# Patient Record
Sex: MALE | Race: WHITE | NOT HISPANIC OR LATINO | Employment: UNEMPLOYED | URBAN - NONMETROPOLITAN AREA
[De-identification: names, ages, dates, MRNs, and addresses within clinical notes are randomized per-mention and may not be internally consistent; named-entity substitution may affect disease eponyms.]

---

## 2021-03-18 ENCOUNTER — OFFICE VISIT CONVERTED (OUTPATIENT)
Dept: FAMILY MEDICINE CLINIC | Age: 31
End: 2021-03-18
Attending: NURSE PRACTITIONER

## 2021-05-17 ENCOUNTER — HOSPITAL ENCOUNTER (OUTPATIENT)
Dept: OTHER | Facility: HOSPITAL | Age: 31
Discharge: HOME | End: 2021-05-17
Attending: NURSE PRACTITIONER

## 2021-05-17 ENCOUNTER — OFFICE VISIT CONVERTED (OUTPATIENT)
Dept: FAMILY MEDICINE CLINIC | Age: 31
End: 2021-05-17
Attending: NURSE PRACTITIONER

## 2021-05-17 LAB — SARS-COV-2 RNA SPEC QL NAA+PROBE: DETECTED

## 2021-05-18 NOTE — PROGRESS NOTES
Alok Bedoya  1990     Office/Outpatient Visit    Visit Date: Thu, Mar 18, 2021 01:08 pm    Provider: Maryann Oconnell N.P. (Assistant: Ilda Steward LPN)    Location: [facility]        Electronically signed by Maryann Oconnell N.P. on  03/21/2021 08:08:37 PM                             Subjective:        CC: Venu is a 30 year old White male.  This is his first visit to the clinic.  est. care         HPI:       tee rocha therapist once per week.    PHQ-9 Depression Screening: Completed form scanned and in chart; Total Score 0           With regard to the encounter for general adult medical examination without abnormal findings, he cannot recall when he last had a physical exam.  He has never had a chest x-ray. His last ECG was 5 years ago and was normal.   He's had vision screening done in 3- and this was abnormal, revealing at rehab-  getting glasses.  Depression screen is performed and is negative.  He is current with his Td.  He is not current with influenza immunization.  states he got caught up in social and closet drinking (whisky) the last couple of years .  he checked himself into rehab 0  cannot recall name but near [place] , [place].  labs done at rehab and not anemic and liver enzymes normal.  he is attending counseling and alcoholics anonymous.  denies concerns.  has not smoked marijuana in over one month.      ROS:         GASTROINTESTINAL:  Negative for abdominal pain, heartburn, constipation, diarrhea, and stool changes.      ALLERGIC/IMMUNOLOGIC:  Positive for seasonal allergies.  CONSTITUTIONAL:  Negative for chills, fatigue, fever, and weight change.      E/N/T:  Negative for hearing problems, E/N/T pain, congestion, rhinorrhea, epistaxis, hoarseness, and dental problems.      CARDIOVASCULAR:  Negative for chest pain, palpitations, tachycardia, orthopnea, and edema.      RESPIRATORY:  Negative for cough, dyspnea, and hemoptysis.      GASTROINTESTINAL:   Negative for abdominal pain, heartburn, constipation, diarrhea, and stool changes.      MUSCULOSKELETAL:  Negative for arthralgias, back pain, and myalgias.      NEUROLOGICAL:  Negative for dizziness, headaches, paresthesias, and weakness.      ALLERGIC/IMMUNOLOGIC:  Positive for seasonal allergies.      PSYCHIATRIC:  Positive for feelings of stress and recreational drug use.   Negative for anxiety, depression, sleep disturbance or suicidal thoughts.          Past Medical History / Family History / Social History:         Last Reviewed on 3/18/2021 01:19 PM by Maryann Oconnell    Past Medical History: cardiac evaluation of murmur as a child and at age 26 normal.             PAST MEDICAL HISTORY     rehab near [place] feb 25 thru march 12 for alcoholism.  has IOP today at 4 pm via zoom.      anemia         Surgical History:         left thumb repair 2019 [facility];         Family History:     Father: Coronary Artery Disease;  Type 2 Diabetes     Mother: Hypertension;  Migraines         Social History:     Occupation: Unemployed     Marital Status: Single     Children: None         Tobacco/Alcohol/Supplements:     Last Reviewed on 3/18/2021 01:13 PM by Ilda Steward    Tobacco: He has a past history of cigarette smoking; quit date:  03/2021.   Current Smoker: He currently smokes every day, 1-5 cigarettes per day.   He currently uses smokeless tobacco, 1 can will last two weeks cans/pouches daily..          Current Problems:     Encounter for screening for depression        Immunizations:     None        Allergies:     Last Reviewed on 3/18/2021 01:13 PM by Ilda Steward    No Known Allergies.        Current Medications:     Last Reviewed on 10/28/2014 06:20 PM by Lisa Villalobos    None        Objective:        Vitals:         Current: 3/18/2021 1:15:02 PM    Ht:  5 ft, 11.25 in;  Wt: 152.2 lbs;  BMI: 21.1T: 96.5 F (temporal);  BP: 137/81 mm Hg (right arm, sitting);  P: 78 bpm (right arm (BP Cuff), sitting);  sCr:  1.04 mg/dL;  GFR: 94.17        Exams:     PHYSICAL EXAM:     GENERAL:  well developed and nourished; appropriately groomed; in no apparent distress;     EYES: PERRL, EOMI     E/N/T: EARS: bilateral TMs are normal;  OROPHARYNX: posterior pharynx, including tonsils, tongue, and uvula are normal;     NECK: thyroid is non-palpable;     RESPIRATORY: normal respiratory rate and pattern with no distress; normal breath sounds with no rales, rhonchi, wheezes or rubs;     CARDIOVASCULAR: normal rate; rhythm is regular;  no edema;     GASTROINTESTINAL: nontender; normal bowel sounds; no organomegaly;     MUSCULOSKELETAL:  Normal range of motion, strength and tone;     NEUROLOGIC: mental status: alert and oriented x 3; GROSSLY INTACT     PSYCHIATRIC:  appropriate affect and demeanor; normal speech pattern; grossly normal memory;         Assessment:         Z13.31   Encounter for screening for depression       Z00.00   Encounter for general adult medical examination without abnormal findings           ORDERS:         Other Orders:         Depression screen negative  (In-House)                      Plan:         Encounter for screening for depression    MIPS PHQ-9 Depression Screening: Completed form scanned and in chart; Total Score 0; Negative Depression Screen           Orders:         Depression screen negative  (In-House)              Encounter for general adult medical examination without abnormal findings        COUNSELING was provided today regarding the following topics: healthy eating habits, regular exercise, testicular self-exam, STD prevention, alcohol, and declines labs.  states he has had numerous labs recently while in rehab.  commend him on his efforts to refrain from alcohol consumption.  counseling and AA important to continuing sobriety.  he denies any anxiety or depression..            Patient Education Handouts:       Physical Exam 30-39 year, Male        TESTICULAR SELF-EXAM  PTC               Patient Recommendations:        For  Encounter for general adult medical examination without abnormal findings:    Limit dietary intake of fat (especially saturated fat) and cholesterol.  Eat a variety of foods, including plenty of fruits, vegetables, and grain containg fiber, limit fat intake to 30% of total calories. Balance caloric intake with energy expended. Maintaining regular physical activity is advised to help prevent heart disease, hypertension, diabetes, and obesity.    Testicular cancer is the #1 cancer in men ages 15-35.  You should regularly examine your testicles for knots, lumps, or tenderness. Testicular pain, even if not associated with any masses, needs to be evaluated by your doctor! Sexually transmitted diseases may be prevented by abstaining from sexual activity or avoiding sexual contact with high risk partners, and consistently using a condom or female barrier contraceptives plus spermacide.              Charge Capture:         Primary Diagnosis:     Z13.31  Encounter for screening for depression           Orders:        Depression screen negative  (In-House)              Z00.00  Encounter for general adult medical examination without abnormal findings           Orders:      49372  Preventive medicine, new patient, age 18-39 years  (In-House)

## 2021-06-05 NOTE — PROGRESS NOTES
Alok Bedoya  1990     Office/Outpatient Visit    Visit Date: Mon, May 17, 2021 10:52 am    Provider: Radha Álvarez N.P. (Assistant: No Jean MA)    Location: [facility]        Electronically signed by Radha Álvarez N.P. on  05/17/2021 05:39:51 PM                             Subjective:        CC: Venu is a 30 year old White male.  He presents with congestion, no taste or smell, pt states that his dad was dx with covid over the weekend.          HPI:           Complaint of contact with and (suspected) exposure to COVID-19..  The symptom began 4 days ago.  The severity is of mild intensity.  This is the first episode of this type of pain.  Dad is in ICU at [facility] with covid Pneumonia   Has been body aches, chills - no fever that he is aware.  Cough, congestion sinus pressure and loss of taste and smell     ROS:     CONSTITUTIONAL:  Positive for chills and fatigue.   Negative for fever.      E/N/T:  Positive for loss of taste and smell.      CARDIOVASCULAR:  Negative for chest pain and pedal edema.      RESPIRATORY:  Positive for recent cough and pleuritic chest pain.   Negative for dyspnea.      MUSCULOSKELETAL:  Positive for arthralgias and myalgias.      ALLERGIC/IMMUNOLOGIC:  Positive for seasonal allergies.          Past Medical History / Family History / Social History:         Last Reviewed on 5/17/2021 11:29 AM by Radha Álvarez    Past Medical History: cardiac evaluation of murmur as a child and at age 26 normal.             PAST MEDICAL HISTORY     rehab near [place] feb 25 thru march 12 for alcoholism.  has IOP today at 4 pm via zoom.      anemia         Surgical History:         left thumb repair 2019 [facility];         Family History:     Father: Coronary Artery Disease;  Type 2 Diabetes     Mother: Hypertension;  Migraines         Social History:     Occupation: Unemployed     Marital Status: Single     Children: None          Tobacco/Alcohol/Supplements:     Last Reviewed on 3/18/2021 01:13 PM by Ilda Steward    Tobacco: He has a past history of cigarette smoking; quit date:  03/2021.   Current Smoker: He currently smokes every day, 1-5 cigarettes per day.   He currently uses smokeless tobacco, 1 can will last two weeks cans/pouches daily..          Current Problems:     Last Reviewed on 5/17/2021 11:29 AM by Radha Álvarez    Encounter for general adult medical examination without abnormal findings    Encounter for screening for depression    Contact with and (suspected) exposure to COVID-19    Cough        Immunizations:     None        Allergies:     Last Reviewed on 3/18/2021 01:13 PM by Ilda Steward    No Known Allergies.        Current Medications:     Last Reviewed on 5/17/2021 10:55 AM by No Jean        Objective:        Vitals:         Current: 5/17/2021 10:54:56 AM    Ht:  5 ft, 11.25 in;  Wt: 152.7 lbs;  BMI: 21.1T: 96.4 F (temporal);  BP: 131/86 mm Hg (left arm, sitting);  P: 70 bpm (left arm (BP Cuff), sitting);  sCr: 1.04 mg/dL;  GFR: 94.30        Exams:     PHYSICAL EXAM:     GENERAL: Vitals recorded well developed, well nourished;  no apparent distress;     E/N/T: EARS: external auditory canal normal bilaterally and draining bilaterally;  bilateral TMs are normal;  NOSE:  normal nasal mucosa, septum, turbinates, and sinuses; OROPHARYNX:  normal mucosa, dentition, gingiva, and posterior pharynx;     NECK: range of motion is normal;     RESPIRATORY: normal appearance and symmetric expansion of chest wall; normal respiratory rate and pattern with no distress; normal breath sounds with no rales, rhonchi, wheezes or rubs;     CARDIOVASCULAR: normal rate; rhythm is regular;  no edema;     GASTROINTESTINAL: nontender; normal bowel sounds; no organomegaly;     LYMPHATIC: no enlargement of cervical or facial nodes; no supraclavicular nodes;     MUSCULOSKELETAL: normal gait; normal range of motion of all major  muscle groups; no limb or joint pain with range of motion;     NEUROLOGIC: mental status: alert and oriented x 3;     PSYCHIATRIC: appropriate affect and demeanor; normal speech pattern; normal thought and perception;         Lab/Test Results:         Rapid SARS: Negative (05/17/2021),     Performed by: tls (05/17/2021),             Assessment:         R05   Cough       Z20.822   Contact with and (suspected) exposure to COVID-19           ORDERS:         Lab Orders:       [id number]  Coronavirus antigen detection by immunoassay technique, COVID-19  (In-House)            [id number]  COVID 19 Testing  (Send-Out)                      Plan:         Cough    LABORATORY:  Labs ordered to be performed today include COVID 19 Testing and Covid Rapid Sun.            Orders:       [id number]  Coronavirus antigen detection by immunoassay technique, COVID-19  (In-House)            [id number]  COVID 19 Testing  (Send-Out)              Contact with and (suspected) exposure to COVID-19off from work pending result of testing.  I would recommend that he drink plenty of water, lots of rest and avoid contact with others- self quarantine            Charge Capture:         Primary Diagnosis:     R05  Cough           Orders:      [id number]  Office/outpatient visit; established patient, level 3  (In-House)            [id number]  Coronavirus antigen detection by immunoassay technique, COVID-19  (In-House)              Z20.822  Contact with and (suspected) exposure to COVID-19

## 2021-07-02 VITALS
TEMPERATURE: 96.5 F | WEIGHT: 152.2 LBS | DIASTOLIC BLOOD PRESSURE: 81 MMHG | HEART RATE: 78 BPM | BODY MASS INDEX: 21.31 KG/M2 | SYSTOLIC BLOOD PRESSURE: 137 MMHG | HEIGHT: 71 IN

## 2021-07-02 VITALS
SYSTOLIC BLOOD PRESSURE: 131 MMHG | HEART RATE: 70 BPM | TEMPERATURE: 96.4 F | WEIGHT: 152.7 LBS | BODY MASS INDEX: 21.38 KG/M2 | DIASTOLIC BLOOD PRESSURE: 86 MMHG | HEIGHT: 71 IN

## 2021-11-17 ENCOUNTER — HOSPITAL ENCOUNTER (OUTPATIENT)
Dept: GENERAL RADIOLOGY | Facility: HOSPITAL | Age: 31
Discharge: HOME | End: 2021-11-17

## 2021-11-17 ENCOUNTER — LAB (OUTPATIENT)
Dept: LAB | Facility: HOSPITAL | Age: 31
End: 2021-11-17

## 2021-11-17 ENCOUNTER — OFFICE VISIT (OUTPATIENT)
Dept: FAMILY MEDICINE CLINIC | Age: 31
End: 2021-11-17

## 2021-11-17 VITALS
BODY MASS INDEX: 20.92 KG/M2 | HEART RATE: 70 BPM | WEIGHT: 149.4 LBS | TEMPERATURE: 98.2 F | DIASTOLIC BLOOD PRESSURE: 79 MMHG | SYSTOLIC BLOOD PRESSURE: 127 MMHG | HEIGHT: 71 IN

## 2021-11-17 DIAGNOSIS — M54.6 ACUTE MIDLINE THORACIC BACK PAIN: ICD-10-CM

## 2021-11-17 DIAGNOSIS — M54.6 ACUTE MIDLINE THORACIC BACK PAIN: Primary | ICD-10-CM

## 2021-11-17 LAB
ANION GAP SERPL CALCULATED.3IONS-SCNC: 7.9 MMOL/L (ref 5–15)
BUN SERPL-MCNC: 7 MG/DL (ref 6–20)
BUN/CREAT SERPL: 7.7 (ref 7–25)
CALCIUM SPEC-SCNC: 9.7 MG/DL (ref 8.6–10.5)
CHLORIDE SERPL-SCNC: 103 MMOL/L (ref 98–107)
CO2 SERPL-SCNC: 29.1 MMOL/L (ref 22–29)
CREAT SERPL-MCNC: 0.91 MG/DL (ref 0.76–1.27)
GFR SERPL CREATININE-BSD FRML MDRD: 97 ML/MIN/1.73
GLUCOSE SERPL-MCNC: 99 MG/DL (ref 65–99)
POTASSIUM SERPL-SCNC: 4 MMOL/L (ref 3.5–5.2)
SODIUM SERPL-SCNC: 140 MMOL/L (ref 136–145)

## 2021-11-17 PROCEDURE — 99213 OFFICE O/P EST LOW 20 MIN: CPT | Performed by: NURSE PRACTITIONER

## 2021-11-17 PROCEDURE — 72072 X-RAY EXAM THORAC SPINE 3VWS: CPT

## 2021-11-17 PROCEDURE — 80048 BASIC METABOLIC PNL TOTAL CA: CPT

## 2021-11-17 PROCEDURE — 36415 COLL VENOUS BLD VENIPUNCTURE: CPT

## 2021-11-17 RX ORDER — MELOXICAM 7.5 MG/1
7.5 TABLET ORAL DAILY
Qty: 14 TABLET | Refills: 0 | Status: SHIPPED | OUTPATIENT
Start: 2021-11-17 | End: 2021-12-01

## 2021-11-17 RX ORDER — BACLOFEN 10 MG/1
10 TABLET ORAL 3 TIMES DAILY PRN
Qty: 30 TABLET | Refills: 0 | Status: SHIPPED | OUTPATIENT
Start: 2021-11-17 | End: 2022-01-03

## 2021-11-17 NOTE — PROGRESS NOTES
"Chief Complaint  Back Pain    Subjective          Alok Bedoya presents to [facility]  Back pain started today after \"lifting something.\"    Back Pain  The current episode started today. The problem occurs constantly. The problem has been gradually worsening since onset. The pain is present in the thoracic spine. The quality of the pain is described as shooting. The pain is at a severity of 7/10. The pain is moderate. Exacerbated by: \"anything\" Associated symptoms include numbness and tingling. Risk factors: Recently lifting  He has tried bed rest for the symptoms. The treatment provided no relief.       Objective   Vital Signs:   /79 (BP Location: Right arm, Patient Position: Sitting)   Pulse 70   Temp 98.2 °F (36.8 °C) (Oral)   Ht 181 cm (71.26\")   Wt 67.8 kg (149 lb 6.4 oz)   BMI 20.69 kg/m²     Physical Exam  Constitutional:       General: He is not in acute distress.     Appearance: Normal appearance. He is normal weight.   HENT:      Head: Normocephalic.   Eyes:      Pupils: Pupils are equal, round, and reactive to light.      Visual Fields: Right eye visual fields normal and left eye visual fields normal.   Neck:      Trachea: Trachea normal.   Cardiovascular:      Rate and Rhythm: Normal rate and regular rhythm.      Heart sounds: Normal heart sounds.   Pulmonary:      Effort: Pulmonary effort is normal.      Breath sounds: Normal breath sounds and air entry.   Musculoskeletal:         General: No swelling or tenderness.      Right lower leg: No edema.      Left lower leg: No edema.   Skin:     General: Skin is warm and dry.   Neurological:      Mental Status: He is alert and oriented to person, place, and time.   Psychiatric:         Mood and Affect: Mood and affect normal.         Behavior: Behavior normal.         Thought Content: Thought content normal.        Result Review :   The following data was reviewed by: ISABELA Concepcion on 11/17/2021:     "              Assessment and Plan    Diagnoses and all orders for this visit:    1. Acute midline thoracic back pain (Primary)  -     XR Spine Thoracic 3 View; Future  -     meloxicam (Mobic) 7.5 MG tablet; Take 1 tablet by mouth Daily for 14 days.  Dispense: 14 tablet; Refill: 0  -     baclofen (LIORESAL) 10 MG tablet; Take 1 tablet by mouth 3 (Three) Times a Day As Needed for Muscle Spasms.  Dispense: 30 tablet; Refill: 0  -     Basic metabolic panel; Future    Will obtain thoracic x-ray and BMP.  We will send the meloxicam and baclofen.  The patient will defer PT at this time.      Follow Up   Return if symptoms worsen or fail to improve.  Patient was given instructions and counseling regarding his condition or for health maintenance advice. Please see specific information pulled into the AVS if appropriate.

## 2021-11-17 NOTE — PATIENT INSTRUCTIONS
Acute Back Pain, Adult  Acute back pain is sudden and usually short-lived. It is often caused by an injury to the muscles and tissues in the back. The injury may result from:  · A muscle or ligament getting overstretched or torn (strained). Ligaments are tissues that connect bones to each other. Lifting something improperly can cause a back strain.  · Wear and tear (degeneration) of the spinal disks. Spinal disks are circular tissue that provide cushioning between the bones of the spine (vertebrae).  · Twisting motions, such as while playing sports or doing yard work.  · A hit to the back.  · Arthritis.  You may have a physical exam, lab tests, and imaging tests to find the cause of your pain. Acute back pain usually goes away with rest and home care.  Follow these instructions at home:  Managing pain, stiffness, and swelling  · Treatment may include medicines for pain and inflammation that are taken by mouth or applied to the skin, prescription pain medicine, or muscle relaxants. Take over-the-counter and prescription medicines only as told by your health care provider.  · Your health care provider may recommend applying ice during the first 24-48 hours after your pain starts. To do this:  ? Put ice in a plastic bag.  ? Place a towel between your skin and the bag.  ? Leave the ice on for 20 minutes, 2-3 times a day.  · If directed, apply heat to the affected area as often as told by your health care provider. Use the heat source that your health care provider recommends, such as a moist heat pack or a heating pad.  ? Place a towel between your skin and the heat source.  ? Leave the heat on for 20-30 minutes.  ? Remove the heat if your skin turns bright red. This is especially important if you are unable to feel pain, heat, or cold. You have a greater risk of getting burned.  Activity    · Do not stay in bed. Staying in bed for more than 1-2 days can delay your recovery.  · Sit up and stand up straight. Avoid  leaning forward when you sit or hunching over when you stand.  ? If you work at a desk, sit close to it so you do not need to lean over. Keep your chin tucked in. Keep your neck drawn back, and keep your elbows bent at a 90-degree angle (right angle).  ? Sit high and close to the steering wheel when you drive. Add lower back (lumbar) support to your car seat, if needed.  · Take short walks on even surfaces as soon as you are able. Try to increase the length of time you walk each day.  · Do not sit, drive, or  one place for more than 30 minutes at a time. Sitting or standing for long periods of time can put stress on your back.  · Do not drive or use heavy machinery while taking prescription pain medicine.  · Use proper lifting techniques. When you bend and lift, use positions that put less stress on your back:  ? Bend your knees.  ? Keep the load close to your body.  ? Avoid twisting.  · Exercise regularly as told by your health care provider. Exercising helps your back heal faster and helps prevent back injuries by keeping muscles strong and flexible.  · Work with a physical therapist to make a safe exercise program, as recommended by your health care provider. Do any exercises as told by your physical therapist.    Lifestyle  · Maintain a healthy weight. Extra weight puts stress on your back and makes it difficult to have good posture.  · Avoid activities or situations that make you feel anxious or stressed. Stress and anxiety increase muscle tension and can make back pain worse. Learn ways to manage anxiety and stress, such as through exercise.  General instructions  · Sleep on a firm mattress in a comfortable position. Try lying on your side with your knees slightly bent. If you lie on your back, put a pillow under your knees.  · Follow your treatment plan as told by your health care provider. This may include:  ? Cognitive or behavioral therapy.  ? Acupuncture or massage therapy.  ? Meditation or  yoga.  Contact a health care provider if:  · You have pain that is not relieved with rest or medicine.  · You have increasing pain going down into your legs or buttocks.  · Your pain does not improve after 2 weeks.  · You have pain at night.  · You lose weight without trying.  · You have a fever or chills.  Get help right away if:  · You develop new bowel or bladder control problems.  · You have unusual weakness or numbness in your arms or legs.  · You develop nausea or vomiting.  · You develop abdominal pain.  · You feel faint.  Summary  · Acute back pain is sudden and usually short-lived.  · Use proper lifting techniques. When you bend and lift, use positions that put less stress on your back.  · Take over-the-counter and prescription medicines and apply heat or ice as directed by your health care provider.  This information is not intended to replace advice given to you by your health care provider. Make sure you discuss any questions you have with your health care provider.  Document Revised: 10/15/2020 Document Reviewed: 08/01/2018  Elsevier Patient Education © 2021 [employer]

## 2021-11-18 ENCOUNTER — TELEPHONE (OUTPATIENT)
Dept: FAMILY MEDICINE CLINIC | Age: 31
End: 2021-11-18

## 2022-01-03 ENCOUNTER — OFFICE VISIT (OUTPATIENT)
Dept: FAMILY MEDICINE CLINIC | Age: 32
End: 2022-01-03

## 2022-01-03 ENCOUNTER — HOSPITAL ENCOUNTER (OUTPATIENT)
Dept: GENERAL RADIOLOGY | Facility: HOSPITAL | Age: 32
Discharge: HOME | End: 2022-01-03
Admitting: FAMILY MEDICINE

## 2022-01-03 VITALS
SYSTOLIC BLOOD PRESSURE: 151 MMHG | WEIGHT: 150.3 LBS | HEART RATE: 80 BPM | BODY MASS INDEX: 21.04 KG/M2 | DIASTOLIC BLOOD PRESSURE: 79 MMHG | HEIGHT: 71 IN

## 2022-01-03 DIAGNOSIS — M25.512 CHRONIC LEFT SHOULDER PAIN: Primary | ICD-10-CM

## 2022-01-03 DIAGNOSIS — G89.29 CHRONIC LEFT SHOULDER PAIN: Primary | ICD-10-CM

## 2022-01-03 PROCEDURE — 99213 OFFICE O/P EST LOW 20 MIN: CPT | Performed by: FAMILY MEDICINE

## 2022-01-03 PROCEDURE — 73030 X-RAY EXAM OF SHOULDER: CPT

## 2022-01-03 RX ORDER — PREDNISONE 10 MG/1
TABLET ORAL
Qty: 48 TABLET | Refills: 0 | Status: SHIPPED | OUTPATIENT
Start: 2022-01-03 | End: 2022-02-18

## 2022-01-03 RX ORDER — CHLORZOXAZONE 500 MG/1
500 TABLET ORAL 3 TIMES DAILY PRN
Qty: 12 TABLET | Refills: 0 | Status: SHIPPED | OUTPATIENT
Start: 2022-01-03 | End: 2022-02-18

## 2022-01-03 RX ORDER — PREDNISONE 10 MG/1
TABLET ORAL
Qty: 1 EACH | Refills: 0 | Status: SHIPPED | OUTPATIENT
Start: 2022-01-03 | End: 2022-01-03

## 2022-01-03 NOTE — PROGRESS NOTES
Please advise patient his shoulder x-ray was normal.  There does not appear to be any calcific tendinitis as we had discussed and certainly no bony abnormalities fractures or dislocation.  I would advise him to take the steroid pack, muscle relaxers and attend physical therapy as ordered.  He also needs a follow-up appointment within 3 to 4 weeks to make sure he is improving.  Thank you    Patient called back and said he was still suffering from significant disability from his shoulder.  Apparently the steroids are not working.  He still agreeable to go to physical therapy but I agree with him at this juncture that it is time to get orthopedic surgery involved.  Referral was generated for orthopedic surgery referral.

## 2022-01-03 NOTE — PROGRESS NOTES
Chief Complaint  Shoulder Pain (Pt c/o LEFT shoulder pain, pt states that the pain has gotten slightly worse.)    Subjective          Alok Bedoya presents to [facility]  History of Present Illness 31-year-old male with left shoulder pain. Is right handed.  He believes he may have injured it at work but he is not sure.  He was doing a lot of heavy lifting pushing and pulling at work and then a couple of days later he started getting shoulder pain.  He was seen here in November had thoracic spine films which were unremarkable for any acute fractures.  He was placed on a muscle relaxer and he is used all of the baclofen but it has not really helped at all.  He is at the point now where he feels something must be done because he can barely AB duct his arm.  He has a lot of pain in the deltoid and the rotator cuff area on the left.  He rates the pain as just a sharp stabbing constant aching anywhere between 3 out of 10 and if he tries to move it or do any kind of activity such as lifting anything it rises to about a 7 or 8 out of 10 burning stabbing pain.    Review of Systems   Constitutional: Positive for activity change. Negative for appetite change, chills, diaphoresis, fatigue, fever and unexpected weight change.   Respiratory: Negative.    Cardiovascular: Negative.    Musculoskeletal: Positive for arthralgias and myalgias. Negative for back pain, gait problem, joint swelling, neck pain and neck stiffness.   Skin: Negative.    Neurological: Positive for weakness. Negative for numbness.        Patient feels his left arm is quite weak because he just cannot really move it without significant pain in the left shoulder.        No Known Allergies    Current Outpatient Medications:   •  chlorzoxazone (PARAFON FORTE) 500 MG tablet, Take 1 tablet by mouth 3 (Three) Times a Day As Needed for Muscle Spasms., Disp: 12 tablet, Rfl: 0  •  predniSONE (DELTASONE) 10 MG (48) dose pack, Take  "as directed on package., Disp: 1 each, Rfl: 0    Objective   Vital Signs:   /79 (BP Location: Right arm, Patient Position: Sitting)   Pulse 80   Ht 181 cm (71.26\")   Wt 68.2 kg (150 lb 4.8 oz)   BMI 20.81 kg/m²     Physical Exam  Constitutional:       Appearance: Normal appearance. He is normal weight.   Neck:      Vascular: No carotid bruit.   Cardiovascular:      Rate and Rhythm: Normal rate and regular rhythm.      Pulses: Normal pulses.      Heart sounds: Normal heart sounds.   Pulmonary:      Effort: Pulmonary effort is normal.      Breath sounds: Normal breath sounds.   Musculoskeletal:         General: Tenderness and signs of injury present. No swelling or deformity.      Cervical back: Normal range of motion and neck supple. No rigidity or tenderness.      Right lower leg: No edema.      Left lower leg: No edema.      Comments: Range of motion about the left shoulder is severely limited.  Abduction is only to about 30 degrees in an attempt to raise it to the horizontal.  External and internal rotation the patient absolutely refuses to do.  Exam is limited by patient splinting.  I do not appreciate any crepitance or deformity.  There is no point tenderness per se.  Adduction is severely limited as well to only maybe 15 degrees.    Patient's pain is reproduced by attempt to AB duct against challenge.   Lymphadenopathy:      Cervical: No cervical adenopathy.   Neurological:      Mental Status: He is alert.        Result Review :              Assessment and Plan    Diagnoses and all orders for this visit:    1. Chronic left shoulder pain (Primary)  -     XR Shoulder 2+ View Left  -     Discontinue: predniSONE (DELTASONE) 10 MG (48) dose pack; Take as directed on package.  Dispense: 1 each; Refill: 0  -     Ambulatory Referral to Physical Therapy Evaluate and treat; Heat, Electrotherapy; Soft Tissue Mobilizaton, Desensitization, Cross Fiber; Stretching, ROM, Strengthening  -     chlorzoxazone (PARAFON " FORTE) 500 MG tablet; Take 1 tablet by mouth 3 (Three) Times a Day As Needed for Muscle Spasms.  Dispense: 12 tablet; Refill: 0  -     predniSONE (DELTASONE) 10 MG (48) dose pack; Take as directed on package.  Dispense: 1 each; Refill: 0      Patient will be sent to physical therapy and on gondi give him a stronger muscle relaxer along with a steroid pack.  He says he is out of the baclofen so he should discontinue this if he has any of it at home and patient was advised not to take baclofen along with the Parafon forte have given him the day.  X-rays were ordered as well and I will call him with the results when they become available.    Patient should follow-up in 3 to 4 weeks to see if he is making any progress.  If at that time he cannot tolerate physical therapy or he has had minimal improvement in his range of motion and symptoms, he will need orthopedic surgery consult.  If in fact there are abnormalities on the x-ray such as calcific tendinitis, patient may require earlier orthopedic surgery referral.  Follow Up   No follow-ups on file.  Patient was given instructions and counseling regarding his condition or for health maintenance advice. Please see specific information pulled into the AVS if appropriate.

## 2022-01-19 ENCOUNTER — OFFICE VISIT (OUTPATIENT)
Dept: ORTHOPEDIC SURGERY | Facility: CLINIC | Age: 32
End: 2022-01-19

## 2022-01-19 VITALS — OXYGEN SATURATION: 99 % | WEIGHT: 150.4 LBS | BODY MASS INDEX: 21.06 KG/M2 | HEART RATE: 72 BPM | HEIGHT: 71 IN

## 2022-01-19 DIAGNOSIS — M24.812 INTERNAL DERANGEMENT OF LEFT SHOULDER: Primary | ICD-10-CM

## 2022-01-19 DIAGNOSIS — M25.512 ACUTE PAIN OF LEFT SHOULDER: ICD-10-CM

## 2022-01-19 PROCEDURE — 99204 OFFICE O/P NEW MOD 45 MIN: CPT | Performed by: STUDENT IN AN ORGANIZED HEALTH CARE EDUCATION/TRAINING PROGRAM

## 2022-01-19 RX ORDER — IBUPROFEN 800 MG/1
800 TABLET ORAL EVERY 6 HOURS PRN
COMMUNITY
End: 2022-02-18

## 2022-01-19 NOTE — PROGRESS NOTES
"Chief Complaint  Pain of the Left Shoulder    Subjective          Alok Bedoya presents to [facility] for   History of Present Illness    The patient presents here today for evaluation of the left shoulder. He reports a constant pain to the lateral shoulder. The patient does heating and air. He has no specific injury. He reports pain radiates down his arm and to his neck. He has no other complaints. He reports the pain started in November. He denies prior injuries or surgeries to the shoulder.     No Known Allergies     Social History     Socioeconomic History   • Marital status: Single   Tobacco Use   • Smoking status: Former Smoker     Types: Cigarettes   • Smokeless tobacco: Never Used        I reviewed the patient's chief complaint, history of present illness, review of systems, past medical history, surgical history, family history, social history, medications, and allergy list.     REVIEW OF SYSTEMS    Constitutional: Denies fevers, chills, weight loss  Cardiovascular: Denies chest pain, shortness of breath  Skin: Denies rashes, acute skin changes  Neurologic: Denies headache, loss of consciousness  MSK: Left shoulder pain      Objective   Vital Signs:   Pulse 72   Ht 180.3 cm (71\")   Wt 68.2 kg (150 lb 6.4 oz)   SpO2 99%   BMI 20.98 kg/m²     Body mass index is 20.98 kg/m².    Physical Exam    General: Alert. No acute distress.   Left shoulder- tender to lateral shoulder and biceps tendon. Forward elevation active, 60. Passive 180. Pop with elevation. External Rotation 70 with pain. Internal rotation to lower lumbar spine. 4/5 and pain with supraspinatus and infraspinatus testing. 5/5 subscapularis with pain. Positive impingement testing. equivocal [place] testing. Pain and pop with labral testing. Sensation to light touch median, radial, ulnar nerve. Positive AIN, PIN, ulnar nerve. Positive pulses. Full active finger ROM.     Procedures    Imaging Results (Most Recent) "     None                   Assessment and Plan    Diagnoses and all orders for this visit:    1. Internal derangement of left shoulder (Primary)  -     MRI Shoulder Left Without Contrast; Future    2. Acute pain of left shoulder        Discussed the treatment plan with the patient.  Plan for MRI of the left shoulder to evaluate for internal derangement. Advised him to try to maintain his ROM. Plan for OTC medication and topicals and ice for the pain.     Call or return if symptoms worsen or patient has any concerns.     Scribed for Chapin Caballero MD by Chapin Caballero MD  01/19/2022   10:49 EST         Follow Up   After MRI  Patient was given instructions and counseling regarding his condition or for health maintenance advice. Please see specific information pulled into the AVS if appropriate.       I have personally performed the services described in this document as scribed by the above individual and it is both accurate and complete.     Chapin Caballero MD  01/19/22  16:56 EST

## 2022-02-10 ENCOUNTER — HOSPITAL ENCOUNTER (OUTPATIENT)
Dept: MRI IMAGING | Facility: HOSPITAL | Age: 32
Discharge: HOME | End: 2022-02-10
Admitting: STUDENT IN AN ORGANIZED HEALTH CARE EDUCATION/TRAINING PROGRAM

## 2022-02-10 DIAGNOSIS — M24.812 INTERNAL DERANGEMENT OF LEFT SHOULDER: ICD-10-CM

## 2022-02-10 PROCEDURE — 73221 MRI JOINT UPR EXTREM W/O DYE: CPT

## 2022-02-11 ENCOUNTER — TELEPHONE (OUTPATIENT)
Dept: ORTHOPEDIC SURGERY | Facility: CLINIC | Age: 32
End: 2022-02-11

## 2022-02-14 ENCOUNTER — OFFICE VISIT (OUTPATIENT)
Dept: ORTHOPEDIC SURGERY | Facility: CLINIC | Age: 32
End: 2022-02-14

## 2022-02-14 VITALS — HEIGHT: 71 IN | WEIGHT: 148 LBS | HEART RATE: 76 BPM | OXYGEN SATURATION: 99 % | BODY MASS INDEX: 20.72 KG/M2

## 2022-02-14 DIAGNOSIS — S43.432A TEAR OF LEFT GLENOID LABRUM, INITIAL ENCOUNTER: ICD-10-CM

## 2022-02-14 DIAGNOSIS — S43.432A PARALABRAL CYST OF LEFT SHOULDER, INITIAL ENCOUNTER: Primary | ICD-10-CM

## 2022-02-14 PROCEDURE — 99214 OFFICE O/P EST MOD 30 MIN: CPT | Performed by: STUDENT IN AN ORGANIZED HEALTH CARE EDUCATION/TRAINING PROGRAM

## 2022-02-14 RX ORDER — KETOROLAC TROMETHAMINE 10 MG/1
10 TABLET, FILM COATED ORAL EVERY 6 HOURS PRN
Qty: 60 TABLET | Refills: 1 | Status: SHIPPED | OUTPATIENT
Start: 2022-02-14 | End: 2022-02-16

## 2022-02-14 NOTE — PROGRESS NOTES
"Chief Complaint  Pain of the Left Shoulder    Subjective          Alok Bedoya presents to [facility] for   History of Present Illness    The patient presents here today for follow up evaluation of the left shoulder. He reports a constant pain to the lateral shoulder. The patient does heating and air. He has no specific injury. He reports pain radiates down his arm and to his neck. He has no other complaints. He reports the pain started in November. He recently had an MRI and is here today for those results.     Allergies   Allergen Reactions   • Prednisone Myalgia     AND REPORTS CAUSED HIS THROAT DRY OUT AND NOSE TO BLEED        Social History     Socioeconomic History   • Marital status: Single   Tobacco Use   • Smoking status: Current Some Day Smoker     Types: Cigarettes   • Smokeless tobacco: Never Used   • Tobacco comment: OVER A YEAR AGO   Vaping Use   • Vaping Use: Some days   • Substances: Nicotine   • Devices: Pre-filled pod   Substance and Sexual Activity   • Alcohol use: Not Currently   • Drug use: Never   • Sexual activity: Defer        I reviewed the patient's chief complaint, history of present illness, review of systems, past medical history, surgical history, family history, social history, medications, and allergy list.     REVIEW OF SYSTEMS    Constitutional: Denies fevers, chills, weight loss  Cardiovascular: Denies chest pain, shortness of breath  Skin: Denies rashes, acute skin changes  Neurologic: Denies headache, loss of consciousness  MSK: Left shoulder pain      Objective   Vital Signs:   Pulse 76   Ht 180.3 cm (71\")   Wt 67.1 kg (148 lb)   SpO2 99%   BMI 20.64 kg/m²     Body mass index is 20.64 kg/m².    Physical Exam    General: Alert. No acute distress.   Left shoulder-Forward elevation 180 degrees. Popping with ROM. Positive Cuellar. Pain with posterior loading in the shoulder. Neurovascularly intact. Sensation to light touch median, radial, ulnar " nerve. Positive AIN, PIN, ulnar nerve motor function. Positive pulses. 4/5 rotator cuff testing with pain.    Procedures    Imaging Results (Most Recent)     None         [facility] MRI- . Tear of the posterosuperior labrum.  2. Large 3.4 cm x 1.9 cm cystic focus posterior to the posteroinferior labrum favored to represent   a large paralabral cyst.  3. Edema in the distal clavicle and adjacent acromion may represent an osseous contusion in the   setting of previous direct trauma.  Osteolysis related to chronic repetitive type injury would also   be in the differential.  Septic arthritis is considered possible but less likely.          Assessment and Plan    Diagnoses and all orders for this visit:    1. Paralabral cyst of left shoulder, initial encounter (Primary)    2. Tear of left glenoid labrum, initial encounter  -     Discontinue: ketorolac (TORADOL) 10 MG tablet; Take 1 tablet by mouth Every 6 (Six) Hours As Needed for Moderate Pain .  Dispense: 60 tablet; Refill: 1        Discussed the treatment options with the patient, operative vs non-operative. Discussed the risks and benefits of a left shoulder arthroscopic labral repair with excision of paralabral cyst, SAD, and distal clavicle excision. The patient expressed understanding and wished to think about it and will call back to schedule. Prescription for Toradol given today.     Call or return if symptoms worsen or patient has any concerns.     Scribed for Chapin Caballero MD by Chapin Caballero MD  02/14/2022   09:56 EST     Follow Up   2 weeks postoperatively.    Patient was given instructions and counseling regarding his condition or for health maintenance advice. Please see specific information pulled into the AVS if appropriate.       I have personally performed the services described in this document as scribed by the above individual and it is both accurate and complete.     Chapin Caballero MD  03/02/22  14:08 EST

## 2022-02-16 ENCOUNTER — TELEPHONE (OUTPATIENT)
Dept: ORTHOPEDIC SURGERY | Facility: CLINIC | Age: 32
End: 2022-02-16

## 2022-02-16 ENCOUNTER — PREP FOR SURGERY (OUTPATIENT)
Dept: OTHER | Facility: HOSPITAL | Age: 32
End: 2022-02-16

## 2022-02-16 DIAGNOSIS — S43.432A TEAR OF LEFT GLENOID LABRUM, INITIAL ENCOUNTER: Primary | ICD-10-CM

## 2022-02-16 RX ORDER — CEFAZOLIN SODIUM IN 0.9 % NACL 3 G/100 ML
3 INTRAVENOUS SOLUTION, PIGGYBACK (ML) INTRAVENOUS ONCE
Status: CANCELLED | OUTPATIENT
Start: 2022-02-16 | End: 2022-02-16

## 2022-02-16 RX ORDER — KETOROLAC TROMETHAMINE 10 MG/1
10 TABLET, FILM COATED ORAL EVERY 6 HOURS PRN
Qty: 20 TABLET | Refills: 0 | Status: SHIPPED | OUTPATIENT
Start: 2022-02-16 | End: 2022-07-05

## 2022-02-16 RX ORDER — CEFAZOLIN SODIUM 2 G/100ML
2 INJECTION, SOLUTION INTRAVENOUS ONCE
Status: CANCELLED | OUTPATIENT
Start: 2022-02-16 | End: 2022-02-16

## 2022-02-16 NOTE — TELEPHONE ENCOUNTER
SPOKE WITH UVALDO WITH [facility] WHO STATED THEY RECEIVED A TRANSFERRED SCRIPT FROM [facility] FOR TORADOL 10 MG THAT EXCEEDS THE RECOMMENDED DOSAGE AND IT HAD A REFILL ON MEDICATION UVALDO STATED THEY NORMALLY ONLY FILL FOR 5 DAYS.

## 2022-02-16 NOTE — TELEPHONE ENCOUNTER
Provider: FREEMAN  Caller: Tootie Elke (Mother)  Phone Number: [phone]   BO- [phone]  Reason for Call: MOM IS CALLING TO REPORT PT HAS DECIDED TO MOVE FORWARD WITH SX.     BO'S PHONE DOSE NOT ALWAYS WORK, IF NOT WE CAN REACH OUT TO HER.

## 2022-02-24 ENCOUNTER — CLINICAL SUPPORT (OUTPATIENT)
Dept: FAMILY MEDICINE CLINIC | Age: 32
End: 2022-02-24

## 2022-02-24 DIAGNOSIS — S43.432A TEAR OF LEFT GLENOID LABRUM, INITIAL ENCOUNTER: ICD-10-CM

## 2022-02-24 PROCEDURE — 99211 OFF/OP EST MAY X REQ PHY/QHP: CPT | Performed by: FAMILY MEDICINE

## 2022-02-24 PROCEDURE — U0004 COV-19 TEST NON-CDC HGH THRU: HCPCS | Performed by: STUDENT IN AN ORGANIZED HEALTH CARE EDUCATION/TRAINING PROGRAM

## 2022-02-25 DIAGNOSIS — S43.432A TEAR OF LEFT GLENOID LABRUM, INITIAL ENCOUNTER: Primary | ICD-10-CM

## 2022-02-25 LAB — SARS-COV-2 RNA PNL SPEC NAA+PROBE: NOT DETECTED

## 2022-03-03 ENCOUNTER — CLINICAL SUPPORT (OUTPATIENT)
Dept: FAMILY MEDICINE CLINIC | Age: 32
End: 2022-03-03

## 2022-03-03 DIAGNOSIS — S43.432A TEAR OF LEFT GLENOID LABRUM, INITIAL ENCOUNTER: ICD-10-CM

## 2022-03-03 PROCEDURE — U0004 COV-19 TEST NON-CDC HGH THRU: HCPCS | Performed by: STUDENT IN AN ORGANIZED HEALTH CARE EDUCATION/TRAINING PROGRAM

## 2022-03-04 LAB — SARS-COV-2 RNA PNL SPEC NAA+PROBE: NOT DETECTED

## 2022-03-08 ENCOUNTER — ANESTHESIA EVENT (OUTPATIENT)
Dept: PERIOP | Facility: HOSPITAL | Age: 32
End: 2022-03-08

## 2022-03-08 ENCOUNTER — ANESTHESIA (OUTPATIENT)
Dept: PERIOP | Facility: HOSPITAL | Age: 32
End: 2022-03-08

## 2022-03-08 ENCOUNTER — HOSPITAL ENCOUNTER (OUTPATIENT)
Facility: HOSPITAL | Age: 32
Setting detail: HOSPITAL OUTPATIENT SURGERY
Discharge: HOME | End: 2022-03-08
Attending: STUDENT IN AN ORGANIZED HEALTH CARE EDUCATION/TRAINING PROGRAM | Admitting: STUDENT IN AN ORGANIZED HEALTH CARE EDUCATION/TRAINING PROGRAM

## 2022-03-08 VITALS
RESPIRATION RATE: 20 BRPM | OXYGEN SATURATION: 99 % | HEIGHT: 75 IN | SYSTOLIC BLOOD PRESSURE: 152 MMHG | BODY MASS INDEX: 18.2 KG/M2 | TEMPERATURE: 97.5 F | DIASTOLIC BLOOD PRESSURE: 90 MMHG | HEART RATE: 76 BPM | WEIGHT: 146.39 LBS

## 2022-03-08 DIAGNOSIS — S43.432A PARALABRAL CYST OF LEFT SHOULDER, INITIAL ENCOUNTER: Primary | ICD-10-CM

## 2022-03-08 DIAGNOSIS — M24.812 INTERNAL DERANGEMENT OF LEFT SHOULDER: ICD-10-CM

## 2022-03-08 DIAGNOSIS — S43.432A TEAR OF LEFT GLENOID LABRUM, INITIAL ENCOUNTER: ICD-10-CM

## 2022-03-08 PROCEDURE — 25010000002 KETOROLAC TROMETHAMINE PER 15 MG: Performed by: NURSE ANESTHETIST, CERTIFIED REGISTERED

## 2022-03-08 PROCEDURE — 25010000002 MIDAZOLAM PER 1 MG: Performed by: STUDENT IN AN ORGANIZED HEALTH CARE EDUCATION/TRAINING PROGRAM

## 2022-03-08 PROCEDURE — C1713 ANCHOR/SCREW BN/BN,TIS/BN: HCPCS | Performed by: STUDENT IN AN ORGANIZED HEALTH CARE EDUCATION/TRAINING PROGRAM

## 2022-03-08 PROCEDURE — 25010000002 CEFAZOLIN IN DEXTROSE 2-4 GM/100ML-% SOLUTION: Performed by: STUDENT IN AN ORGANIZED HEALTH CARE EDUCATION/TRAINING PROGRAM

## 2022-03-08 PROCEDURE — 0 MEPERIDINE PER 100 MG: Performed by: NURSE ANESTHETIST, CERTIFIED REGISTERED

## 2022-03-08 PROCEDURE — 25010000002 ONDANSETRON PER 1 MG: Performed by: NURSE ANESTHETIST, CERTIFIED REGISTERED

## 2022-03-08 PROCEDURE — 25010000002 EPINEPHRINE PER 0.1 MG: Performed by: STUDENT IN AN ORGANIZED HEALTH CARE EDUCATION/TRAINING PROGRAM

## 2022-03-08 PROCEDURE — 29807 SHO ARTHRS SRG RPR SLAP LES: CPT | Performed by: STUDENT IN AN ORGANIZED HEALTH CARE EDUCATION/TRAINING PROGRAM

## 2022-03-08 PROCEDURE — 25010000002 ROPIVACAINE PER 1 MG: Performed by: STUDENT IN AN ORGANIZED HEALTH CARE EDUCATION/TRAINING PROGRAM

## 2022-03-08 PROCEDURE — 29826 SHO ARTHRS SRG DECOMPRESSION: CPT | Performed by: STUDENT IN AN ORGANIZED HEALTH CARE EDUCATION/TRAINING PROGRAM

## 2022-03-08 PROCEDURE — 25010000002 PROPOFOL 10 MG/ML EMULSION: Performed by: NURSE ANESTHETIST, CERTIFIED REGISTERED

## 2022-03-08 PROCEDURE — 25010000002 DEXAMETHASONE PER 1 MG: Performed by: NURSE ANESTHETIST, CERTIFIED REGISTERED

## 2022-03-08 PROCEDURE — 29824 SHO ARTHRS SRG DSTL CLAVICLC: CPT | Performed by: STUDENT IN AN ORGANIZED HEALTH CARE EDUCATION/TRAINING PROGRAM

## 2022-03-08 DEVICE — SUT/ANCH FIBERTAK KNOTLSS  W/NO2 SUT 1.8MM: Type: IMPLANTABLE DEVICE | Site: SHOULDER | Status: FUNCTIONAL

## 2022-03-08 RX ORDER — OXYCODONE HYDROCHLORIDE 5 MG/1
5 TABLET ORAL
Status: DISCONTINUED | OUTPATIENT
Start: 2022-03-08 | End: 2022-03-08 | Stop reason: HOSPADM

## 2022-03-08 RX ORDER — MEPERIDINE HYDROCHLORIDE 25 MG/ML
12.5 INJECTION INTRAMUSCULAR; INTRAVENOUS; SUBCUTANEOUS
Status: DISCONTINUED | OUTPATIENT
Start: 2022-03-08 | End: 2022-03-08 | Stop reason: HOSPADM

## 2022-03-08 RX ORDER — PROMETHAZINE HYDROCHLORIDE 25 MG/1
25 SUPPOSITORY RECTAL ONCE AS NEEDED
Status: DISCONTINUED | OUTPATIENT
Start: 2022-03-08 | End: 2022-03-08 | Stop reason: HOSPADM

## 2022-03-08 RX ORDER — OXYCODONE HYDROCHLORIDE AND ACETAMINOPHEN 5; 325 MG/1; MG/1
1 TABLET ORAL EVERY 4 HOURS PRN
Qty: 30 TABLET | Refills: 0 | Status: SHIPPED | OUTPATIENT
Start: 2022-03-08 | End: 2022-07-05

## 2022-03-08 RX ORDER — CEFAZOLIN SODIUM 2 G/100ML
2 INJECTION, SOLUTION INTRAVENOUS ONCE
Status: COMPLETED | OUTPATIENT
Start: 2022-03-08 | End: 2022-03-08

## 2022-03-08 RX ORDER — KETOROLAC TROMETHAMINE 30 MG/ML
INJECTION, SOLUTION INTRAMUSCULAR; INTRAVENOUS AS NEEDED
Status: DISCONTINUED | OUTPATIENT
Start: 2022-03-08 | End: 2022-03-08 | Stop reason: SURG

## 2022-03-08 RX ORDER — PROPOFOL 10 MG/ML
VIAL (ML) INTRAVENOUS AS NEEDED
Status: DISCONTINUED | OUTPATIENT
Start: 2022-03-08 | End: 2022-03-08 | Stop reason: SURG

## 2022-03-08 RX ORDER — ACETAMINOPHEN 500 MG
1000 TABLET ORAL ONCE
Status: COMPLETED | OUTPATIENT
Start: 2022-03-08 | End: 2022-03-08

## 2022-03-08 RX ORDER — DEXAMETHASONE SODIUM PHOSPHATE 4 MG/ML
INJECTION, SOLUTION INTRA-ARTICULAR; INTRALESIONAL; INTRAMUSCULAR; INTRAVENOUS; SOFT TISSUE AS NEEDED
Status: DISCONTINUED | OUTPATIENT
Start: 2022-03-08 | End: 2022-03-08 | Stop reason: SURG

## 2022-03-08 RX ORDER — PROMETHAZINE HYDROCHLORIDE 12.5 MG/1
25 TABLET ORAL ONCE AS NEEDED
Status: DISCONTINUED | OUTPATIENT
Start: 2022-03-08 | End: 2022-03-08 | Stop reason: HOSPADM

## 2022-03-08 RX ORDER — DOCUSATE SODIUM 100 MG/1
100 CAPSULE, LIQUID FILLED ORAL 2 TIMES DAILY PRN
Qty: 20 CAPSULE | Refills: 0 | Status: SHIPPED | OUTPATIENT
Start: 2022-03-08 | End: 2022-07-05

## 2022-03-08 RX ORDER — GLYCOPYRROLATE 0.2 MG/ML
INJECTION INTRAMUSCULAR; INTRAVENOUS AS NEEDED
Status: DISCONTINUED | OUTPATIENT
Start: 2022-03-08 | End: 2022-03-08 | Stop reason: SURG

## 2022-03-08 RX ORDER — ONDANSETRON 4 MG/1
4 TABLET, FILM COATED ORAL EVERY 8 HOURS PRN
Qty: 30 TABLET | Refills: 0 | Status: SHIPPED | OUTPATIENT
Start: 2022-03-08 | End: 2022-07-05

## 2022-03-08 RX ORDER — KETAMINE HYDROCHLORIDE 50 MG/ML
INJECTION, SOLUTION, CONCENTRATE INTRAMUSCULAR; INTRAVENOUS AS NEEDED
Status: DISCONTINUED | OUTPATIENT
Start: 2022-03-08 | End: 2022-03-08 | Stop reason: SURG

## 2022-03-08 RX ORDER — MAGNESIUM HYDROXIDE 1200 MG/15ML
LIQUID ORAL AS NEEDED
Status: DISCONTINUED | OUTPATIENT
Start: 2022-03-08 | End: 2022-03-08 | Stop reason: HOSPADM

## 2022-03-08 RX ORDER — SODIUM CHLORIDE, SODIUM LACTATE, POTASSIUM CHLORIDE, CALCIUM CHLORIDE 600; 310; 30; 20 MG/100ML; MG/100ML; MG/100ML; MG/100ML
9 INJECTION, SOLUTION INTRAVENOUS CONTINUOUS PRN
Status: DISCONTINUED | OUTPATIENT
Start: 2022-03-08 | End: 2022-03-08 | Stop reason: HOSPADM

## 2022-03-08 RX ORDER — ROPIVACAINE HYDROCHLORIDE 5 MG/ML
INJECTION, SOLUTION EPIDURAL; INFILTRATION; PERINEURAL
Status: COMPLETED | OUTPATIENT
Start: 2022-03-08 | End: 2022-03-08

## 2022-03-08 RX ORDER — DEXMEDETOMIDINE HYDROCHLORIDE 100 UG/ML
INJECTION, SOLUTION INTRAVENOUS AS NEEDED
Status: DISCONTINUED | OUTPATIENT
Start: 2022-03-08 | End: 2022-03-08 | Stop reason: SURG

## 2022-03-08 RX ORDER — ONDANSETRON 2 MG/ML
INJECTION INTRAMUSCULAR; INTRAVENOUS AS NEEDED
Status: DISCONTINUED | OUTPATIENT
Start: 2022-03-08 | End: 2022-03-08 | Stop reason: SURG

## 2022-03-08 RX ORDER — CEFAZOLIN SODIUM IN 0.9 % NACL 3 G/100 ML
3 INTRAVENOUS SOLUTION, PIGGYBACK (ML) INTRAVENOUS ONCE
Status: DISCONTINUED | OUTPATIENT
Start: 2022-03-08 | End: 2022-03-08 | Stop reason: SDUPTHER

## 2022-03-08 RX ORDER — ONDANSETRON 2 MG/ML
4 INJECTION INTRAMUSCULAR; INTRAVENOUS ONCE AS NEEDED
Status: DISCONTINUED | OUTPATIENT
Start: 2022-03-08 | End: 2022-03-08 | Stop reason: HOSPADM

## 2022-03-08 RX ORDER — LIDOCAINE HYDROCHLORIDE 20 MG/ML
INJECTION, SOLUTION INFILTRATION; PERINEURAL AS NEEDED
Status: DISCONTINUED | OUTPATIENT
Start: 2022-03-08 | End: 2022-03-08 | Stop reason: SURG

## 2022-03-08 RX ORDER — MIDAZOLAM HYDROCHLORIDE 1 MG/ML
4 INJECTION INTRAMUSCULAR; INTRAVENOUS ONCE
Status: COMPLETED | OUTPATIENT
Start: 2022-03-08 | End: 2022-03-08

## 2022-03-08 RX ORDER — ROCURONIUM BROMIDE 10 MG/ML
INJECTION, SOLUTION INTRAVENOUS AS NEEDED
Status: DISCONTINUED | OUTPATIENT
Start: 2022-03-08 | End: 2022-03-08 | Stop reason: SURG

## 2022-03-08 RX ADMIN — DEXAMETHASONE SODIUM PHOSPHATE 8 MG: 4 INJECTION INTRA-ARTICULAR; INTRALESIONAL; INTRAMUSCULAR; INTRAVENOUS; SOFT TISSUE at 10:21

## 2022-03-08 RX ADMIN — LIDOCAINE HYDROCHLORIDE 100 MG: 20 INJECTION, SOLUTION INFILTRATION; PERINEURAL at 10:15

## 2022-03-08 RX ADMIN — MEPERIDINE HYDROCHLORIDE 12.5 MG: 25 INJECTION INTRAMUSCULAR; INTRAVENOUS; SUBCUTANEOUS at 12:45

## 2022-03-08 RX ADMIN — ONDANSETRON 4 MG: 2 INJECTION INTRAMUSCULAR; INTRAVENOUS at 11:38

## 2022-03-08 RX ADMIN — DEXMEDETOMIDINE HYDROCHLORIDE 40 MCG: 100 INJECTION, SOLUTION, CONCENTRATE INTRAVENOUS at 10:15

## 2022-03-08 RX ADMIN — PROPOFOL 200 MG: 10 INJECTION, EMULSION INTRAVENOUS at 10:15

## 2022-03-08 RX ADMIN — CEFAZOLIN SODIUM 2 G: 2 INJECTION, SOLUTION INTRAVENOUS at 10:17

## 2022-03-08 RX ADMIN — SODIUM CHLORIDE, POTASSIUM CHLORIDE, SODIUM LACTATE AND CALCIUM CHLORIDE: 600; 310; 30; 20 INJECTION, SOLUTION INTRAVENOUS at 11:38

## 2022-03-08 RX ADMIN — ROPIVACAINE HYDROCHLORIDE 30 ML: 5 INJECTION, SOLUTION EPIDURAL; INFILTRATION; PERINEURAL at 08:57

## 2022-03-08 RX ADMIN — KETAMINE HYDROCHLORIDE 50 MG: 50 INJECTION, SOLUTION INTRAMUSCULAR; INTRAVENOUS at 10:15

## 2022-03-08 RX ADMIN — ROCURONIUM BROMIDE 50 MG: 10 INJECTION INTRAVENOUS at 10:15

## 2022-03-08 RX ADMIN — MIDAZOLAM HYDROCHLORIDE 4 MG: 1 INJECTION, SOLUTION INTRAMUSCULAR; INTRAVENOUS at 08:52

## 2022-03-08 RX ADMIN — KETOROLAC TROMETHAMINE 30 MG: 30 INJECTION, SOLUTION INTRAMUSCULAR; INTRAVENOUS at 10:42

## 2022-03-08 RX ADMIN — ACETAMINOPHEN 1000 MG: 500 TABLET ORAL at 08:23

## 2022-03-08 RX ADMIN — SODIUM CHLORIDE, POTASSIUM CHLORIDE, SODIUM LACTATE AND CALCIUM CHLORIDE 9 ML/HR: 600; 310; 30; 20 INJECTION, SOLUTION INTRAVENOUS at 09:09

## 2022-03-08 RX ADMIN — GLYCOPYRROLATE 0.2 MG: 0.2 INJECTION INTRAMUSCULAR; INTRAVENOUS at 10:21

## 2022-03-08 NOTE — OP NOTE
SHOULDER ARTHROSCOPY LABRAL REPAIR  Procedure Report    Patient Name:  Alok Bedoya  YOB: 1990    Date of Surgery:  3/8/2022     Indications: Alok is a 31-year-old male with a long history of left shoulder pain.  He had pain and popping with elevation.  He had pain with overhead lifting.  His MRI revealed a tear of the posterior superior labrum, a para labral cyst, and distal clavicle osteolysis.  We attempted nonoperative measures.  We discussed surgical treatment.  We specifically discussed left shoulder arthroscopy with labral repair, decompression of the paralabral cyst, subacromial decompression, and distal clavicle excision.  We discussed the primary benefits of surgery including pain relief and improve shoulder function.  We discussed surgical risk including bleeding, infection, damage to nerves and blood vessels, hardware complications, fracture, chondral injury, retear, stiffness, persistent pain, anesthesia risk, DVT/PE, and need for additional procedures.  He elected to proceed with surgical management.    Pre-op Diagnosis:   Tear of left glenoid labrum, initial encounter [S43.432A]       Post-Op Diagnosis Codes:     * Tear of left glenoid labrum, initial encounter [S43.432A]    Procedure/CPT® Codes:      Procedure(s):  LEFT SHOULDER ARTHROSCOPIC LABRAL REPAIR WITH DECOMPRESSION PARALABRAL CYST, SUBCROMIAL DECOMPRESSION,DISTAL CLAVICLE EXCISION    Staff:  Surgeon(s):  Chapin Caballero MD    Assistant: Milvia Mera PA-C    Anesthesia: General    Estimated Blood Loss: 30 ML    Implants:    Implant Name Type Inv. Item Serial No.  Lot No. LRB No. Used Action   SUT/ANCH FIBERTAK KNOTLSS  W/NO2 SUT 1.8MM - [id number] Implant SUT/ANCH FIBERTAK KNOTLSS  W/NO2 SUT 1.8MM  [employer] [id number] Left 2 Implanted       Specimen:          None        Findings: SLAP tear with unstable biceps anchor, distal clavicle osteolysis    Complications: None    Description of Procedure: The  patient was met in the preoperative holding area and the operative extremity was marked.  A preoperative peripheral nerve block was performed by the anesthesia team.  The patient was transported the operating room and laid supine on the operating room table.  General anesthesia was induced.  2 g of preoperative Ancef were administered.  The patient was then carefully placed in the lateral decubitus beachchair position with all extremities well-padded.  He was secured with a beanbag.  An arm holding device was used during the case.  The left upper extremity was prepped and draped in the usual sterile fashion.  A timeout was taken to ensure the appropriate patient, procedure, and procedural site.  All were in agreement.  I began by marking the superficial landmarks over the shoulder.  A vertical incision was made for a posterior viewing portal and the arthroscope was inserted into the glenohumeral joint.  A diagnostic arthroscopy was performed.  A SLAP tear was identified.  The biceps anchor was unstable and detached from the superior glenoid.  The posterior inferior and anterior inferior labrum were intact.  The rotator cuff was intact.  The biceps tendon was intact.  A  vertical incision was made for a high anterior working portal after needle localization.  I inserted the arthroscopic shaver.  The interval tissue was debrided.  An Arthrex purple cannula was inserted.  I switch my visualization to the anterior superior portal and evaluated the posterior labrum thoroughly.  This was intact and stable along the posterior rim.  The paralabral cyst was not directly identified.  I did use a blunt switching stick and ran this down the posterior border of the glenoid in an attempt to decompress the cyst.  I then returned to the posterior viewing portal.  A second anterior portal was created after needle localization through a vertical incision just superior to the subscapularis tendon.  A second Arthrex purple cannula was  inserted.  I utilized the arthroscopic shaver, elevator, and bur to prepare the superior glenoid for a SLAP repair.  I then drilled and inserted two 1.8 mm fiber tack knotless anchors without complication.  One anchor was placed anterior to the biceps insertion, and one anchor posterior to the biceps insertion.  I utilized the suture lasso to retrieve and shuttled the stitches appropriately.  This secured the biceps anchor without complication.  The sutures were cut.  The arthroscope was then removed from the glenohumeral joint and inserted into the subacromial space.  A vertical incision for a lateral working portal was created after needle localization.  A subacromial bursectomy was performed.   I then utilized the arthroscopic shaver to perform a subacromial decompression, elevating the anterior enthesophyte on the acromion.  This was carried back to the level of the AC joint.  Degenerative and cystic changes were noted at the distal clavicle.  I utilized the cautery device to debride the soft tissue at the AC joint.  I then performed a distal clavicle excision utilizing the arthroscopic shaver in standard fashion removing approximately 1 cm of bone.  At this point I was satisfied with my work in the shoulder.  All arthroscopic instrumentation was removed.  Wounds were closed with 3-0 nylon in interrupted fashion.  Wounds were dressed with Xeroform, 4 x 4, ABD, and tape.  The patient was placed into an abduction sling.  He woke from anesthesia without complication and was transferred to the recovery room in stable condition.  All surgical counts were correct.    Assistant: Milvia Mera PA-C  was responsible for performing the following activities: Retraction, Suction, Irrigation and Placing Dressing and their skilled assistance was necessary for the success of this case.    Chapin Caabllero MD     Date: 3/8/2022  Time: 12:38 EST

## 2022-03-08 NOTE — ANESTHESIA PROCEDURE NOTES
Peripheral Block      Patient reassessed immediately prior to procedure    Patient location during procedure: pre-op  Start time: 3/8/2022 8:57 AM  Stop time: 3/8/2022 8:59 AM  Reason for block: at surgeon's request and post-op pain management  Performed by  Anesthesiologist: Lanny Lackey DO  Preanesthetic Checklist  Completed: patient identified, IV checked, site marked, risks and benefits discussed, surgical consent, monitors and equipment checked, pre-op evaluation and timeout performed  Prep:  Pt Position: supine (HOB elevated)  Sterile barriers:cap, washed/disinfected hands, sterile barriers, gloves, mask, partial drape and alcohol skin prep  Prep: ChloraPrep  Patient monitoring: blood pressure monitoring, continuous pulse oximetry and EKG  Procedure    Sedation: yes  Performed under: local infiltration  Guidance:ultrasound guided and nerve stimulator    ULTRASOUND INTERPRETATION.  Using ultrasound guidance a 22 G gauge needle was placed in close proximity to the brachial plexus nerve, at which point, under ultrasound guidance anesthetic was injected in the area of the nerve and spread of the anesthesia was seen on ultrasound in close proximity thereto.  There were no abnormalities seen on ultrasound; a digital image was taken; and the patient tolerated the procedure with no complications. Images:still images obtained, printed/placed on chart    Laterality:left  Block Type:interscalene  Injection Technique:single-shot  Needle Type:echogenic  Needle Gauge:22 G (2in)  Resistance on Injection: none    Medications Used: ropivacaine (NAROPIN) 0.5 % injection, 30 mL  Med administered at 3/8/2022 8:57 AM      Post Assessment  Injection Assessment: negative aspiration for heme, no paresthesia on injection and incremental injection  Patient Tolerance:comfortable throughout block  Complications:no  Additional Notes  The block or continuous infusion is requested by the referring physician for management of  postoperative pain, or pain related to a procedure. Ultrasound guidance (deemed medically necessary). Painless injection, pt was awake and conversant during the procedure without complications. Needle and surrounding structures visualized throughout procedure. No adverse reactions or complications seen during this period. Post-procedure image showed no signs of complication, and anatomy was consistent with an uncomplicated nerve blockade.

## 2022-03-08 NOTE — DISCHARGE INSTRUCTIONS
[facility]  Postop Instructions  Outpatient Shoulder Surgery    DIET  Begin with clear liquids and light foods (jello, soups, etc).  Progress to your normal diet if you are not nauseated.  Take pain medicine with food - crackers, bread, etc.    WOUND CARE  Maintain your operative dressing, loosen bandage if swelling or tingling of the elbow, wrist, or hand occurs.  It is normal for the shoulder to bleed and swell from the surgery site.  If blood soaks onto the bandage, do not become alarmed; reinforce with additional dressing.  If blood saturates more than 2 bandages, call [facility].  Remove surgical dressing on the 2nd post-operative day. If minimal drainage is present, apply bandaids over incisions.  Do not use antibiotic ointment.  To avoid infection, keep surgical incisions clean and dry.  You may shower on the 2nd day but do not submerge.    MEDICATIONS  Pain medication is injected into the wound and shoulder joint during surgery.  This will wear off within 8 to 12 hours.  The anesthesiologist's regional nerve block will usually wear off in 18 to 24 hours.  Aleve 500mg 2 times per day may be taken for pain if you do not have a history of bleeding or ulcers.  Some patients will require narcotic pain medication for a short period of time.  This can be taken as per directions on the bottle.  Potential narcotic side effects include: constipation, nausea, vomiting, sleepiness.  If nausea and vomiting continue for more than 12-24 hours, contact the office to have your medication changed.  Do not drive a car or operate machinery while taking the prescription pain medication.  Increase vegetables, whole grains, and water intake to decrease risk of constipation related to pain medications.  Drink a full glass of water with every dose of medication (prescription or over the counter) and take with food.    ACTIVITY  When sleeping or resting, inclined positions (ie recliner chair) and a pillow under the forearm  for support may provide better comfort.  Do not engage in activities which increase pain/swelling (lifting or any repetitive above shoulder-level activities) over the first 7-10 days following surgery or activities where you bring your arm away from your body.  Avoid long periods of sitting (without arm supported) or long distance traveling for 2 weeks.  No driving or operating heavy machinery until you are off all prescription pain medications.  You may return to sedentary work or school 1-3 days after surgery, if pain is tolerable with sling.  SLING  Repairs and Reconstructions:  For the first two weeks, you must wear sling/immobilizer at all times except when doing exercises.  When around people in close proximity or in inclement weather, you should wear sling for protection.    ICE THERAPY  Begin icing immediately after surgery using ice compression machine or cold packs.    EXERCISE   Begin shoulder exercises the following day after surgery unless otherwise instructed by the surgeon.  PendulumNeris parisi 3 x daily  You may also begin elbow, wrist, and hand range of motion on the first post-operative day about 2-3 times per day.  Formal physical therapy, if needed, will begin 2 - 6 weeks after surgery.      Neris                             Pendulums                           EMERGENCIES  Contact [facility] if any of the following are present:  Painful swelling or numbness, tingling, color change, or coolness in the wrist or hand  Unrelenting pain  Fever over 101 (It is normal to have a low grade fever for the first day or two following surgery.)   Redness or worsening pain around incisions  Continuous drainage or bleeding from incision (a small amount of drainage is expected)  Difficulty breathing, wheezing  Excessive nausea/vomiting causing inability to keep anything down for 12-24hours  Inability to urinate    WHAT TO EXPECT AT YOUR FIRST POST OPERATIVE VISIT  Suture/stitches will be removed and new bandage  applied if needed.  Follow up X-rays may be taken      DISCHARGE INSTRUCTIONS  SURGICAL / AMBULATORY  PROCEDURES      For your surgery you had:  General anesthesia (you may have a sore throat for the first 24 hours)  IV sedation.  Local anesthesia  Monitored anesthesia Care  You received a medicated patch for nausea prevention today (behind your ear). It is recommended that you remove it 24-48 hours post-operatively. It must be removed within 72 hours.   You may experience dizziness, drowsiness, or light-headedness for several hours following surgery/procedure.  Do not stay alone today or tonight.  Limit your activity for 24 hours.  Resume your diet slowly.  Follow whatever special dietary instructions you may have been given by your doctor.  You should not drive or operate machinery, drink alcohol, or sign legally binding documents for 24 hours or while you are taking pain medication.  Last dose of pain medication was given at:   .  NOTIFY YOUR DOCTOR IF YOU EXPERIENCE ANY OF THE FOLLOWING:  Temperature greater than 101 degrees Fahrenheit  Shaking Chills  Redness or excessive drainage from incision  Nausea, vomiting and/or pain that is not controlled by prescribed medications  Increase in bleeding or bleeding that is excessive  Unable to urinate in 6 hours after surgery  If unable to reach your doctor, please go to the closest Emergency Room  You may begin dressing changes:     [x] in 48 hours  You may remove Band-Aid/dressing in 48 hours.  You may shower in 48 hours.  Medications per physician instructions as indicated on Discharge Medication Information Sheet.    SPECIAL INSTRUCTIONS:

## 2022-03-08 NOTE — INTERVAL H&P NOTE
H&P reviewed. The patient was examined and there are no changes to the H&P.     I have seen and examined the above patient and agree with the plan.     Cardiac: Intact peripheral pulses  Pulmonary: Nonlabored respirations on room air.     We again reviewed treatment options today.  We discussed surgical management for his left shoulder.  We reviewed the risks, benefits, indications, and alternatives to left shoulder arthroscopy with labral repair, cyst decompression, subacromial decompression, and distal clavicle excision.  We discussed the rehab process and expectations.  The patient elected to proceed with surgical management.    Electronically signed by Chapin Caballero MD, 03/08/22, 8:02 AM EST.

## 2022-03-08 NOTE — ANESTHESIA POSTPROCEDURE EVALUATION
Patient: Alok Bedoya    Procedure Summary     Date: 03/08/22 Room / Location: [facility]  / [facility]    Anesthesia Start: 1015 Anesthesia Stop: 1239    Procedure: LEFT SHOULDER ARTHROSCOPIC LABRAL REPAIR WITH EXCISION PARALABRAL CYST, SUBCROMIAL DECOMPRESSION,DISTAL CLAVICLE EXCISION (Left Shoulder) Diagnosis:       Tear of left glenoid labrum, initial encounter      (Tear of left glenoid labrum, initial encounter [S43.432A])    Surgeons: Chapin Caballero MD Provider: Lanny Lackey DO    Anesthesia Type: general, regional ASA Status: 2          Anesthesia Type: general, regional    Vitals  Vitals Value Taken Time   /104 03/08/22 1316   Temp     Pulse 70 03/08/22 1315   Resp     SpO2 99 % 03/08/22 1315   Vitals shown include unvalidated device data.        Post Anesthesia Care and Evaluation    Patient location during evaluation: bedside  Patient participation: complete - patient participated  Level of consciousness: awake  Pain management: adequate  Airway patency: patent  Anesthetic complications: No anesthetic complications  PONV Status: none  Cardiovascular status: acceptable and stable  Respiratory status: acceptable  Hydration status: acceptable    Comments: An Anesthesiologist personally participated in the most demanding procedures (including induction and emergence if applicable) in the anesthesia plan, monitored the course of anesthesia administration at frequent intervals and remained physically present and available for immediate diagnosis and treatment of emergencies.

## 2022-03-08 NOTE — ANESTHESIA PREPROCEDURE EVALUATION
Anesthesia Evaluation     Patient summary reviewed and Nursing notes reviewed   no history of anesthetic complications:  NPO Solid Status: > 8 hours  NPO Liquid Status: > 2 hours           Airway   Mallampati: I  TM distance: >3 FB  No difficulty expected  Dental - normal exam     Pulmonary - normal exam   (+) a smoker Current Smoked day of surgery,   Cardiovascular - normal exam  Exercise tolerance: good (4-7 METS)    (+) valvular problems/murmurs murmur,       Neuro/Psych- negative ROS  GI/Hepatic/Renal/Endo - negative ROS     Musculoskeletal (-) negative ROS    Abdominal  - normal exam   Substance History - negative use     OB/GYN negative ob/gyn ROS         Other - negative ROS                       Anesthesia Plan    ASA 2     general and regional   (Patient understands anesthesia not responsible for dental damage. Regional anesthesia options discussed with patient. Pt accepts regional block.)  intravenous induction     Anesthetic plan, all risks, benefits, and alternatives have been provided, discussed and informed consent has been obtained with: patient.    Plan discussed with CRNA.        CODE STATUS:

## 2022-03-23 ENCOUNTER — OFFICE VISIT (OUTPATIENT)
Dept: ORTHOPEDIC SURGERY | Facility: CLINIC | Age: 32
End: 2022-03-23

## 2022-03-23 VITALS — BODY MASS INDEX: 18.65 KG/M2 | HEIGHT: 75 IN | WEIGHT: 150 LBS

## 2022-03-23 DIAGNOSIS — Z98.890 S/P ARTHROSCOPY OF LEFT SHOULDER: ICD-10-CM

## 2022-03-23 DIAGNOSIS — S43.432D TEAR OF LEFT GLENOID LABRUM, SUBSEQUENT ENCOUNTER: Primary | ICD-10-CM

## 2022-03-23 PROCEDURE — 99024 POSTOP FOLLOW-UP VISIT: CPT | Performed by: PHYSICIAN ASSISTANT

## 2022-03-23 NOTE — PROGRESS NOTES
"Chief Complaint  Follow-up of the Left Shoulder    Subjective          Alok Bedoya presents to [facility] for   History of Present Illness    Alok Bedoya presents today for follow-up of his left shoulder.  Patient had a left shoulder arthroscopic labral repair with excision of paralabral cyst, subacromial decompression, distal clavicle excision performed by Dr. Caballero on 3/20/2022.  Today, patient states he is doing well.  He is continued with his sling at all times aside from working on gentle shoulder range of motion exercises.  He denies any complications with his range of motion exercises.  He states that he is taking ibuprofen as needed for pain.  He denies any complications, redness, or drainage from his incision sites.  He denies fever or chills.      Allergies   Allergen Reactions   • Prednisone Myalgia     AND REPORTS CAUSED HIS THROAT DRY OUT AND NOSE TO BLEED        Social History     Socioeconomic History   • Marital status: Single   Tobacco Use   • Smoking status: Former Smoker     Types: Cigarettes   • Smokeless tobacco: Never Used   • Tobacco comment: OVER A YEAR AGO   Vaping Use   • Vaping Use: Some days   • Substances: Nicotine   • Devices: Pre-filled pod   Substance and Sexual Activity   • Alcohol use: Not Currently   • Drug use: Never   • Sexual activity: Defer        I reviewed the patient's chief complaint, history of present illness, review of systems, past medical history, surgical history, family history, social history, medications, and allergy list.     REVIEW OF SYSTEMS    Constitutional: Denies fevers, chills, weight loss  Cardiovascular: Denies chest pain, shortness of breath  Skin: Denies rashes, acute skin changes  Neurologic: Denies headache, loss of consciousness  MSK: Left shoulder pain.       Objective   Vital Signs:   Ht 190.5 cm (75\")   Wt 68 kg (150 lb)   BMI 18.75 kg/m²     Body mass index is 18.75 kg/m².    Physical " Exam    General: Alert, no acute distress  Left shoulder: Sutures removed today without complications. Well healing arthroscopic incisions about the shoulder. No active drainage or redness noted.  Resolving bruising on medial portion of the upper arm.  Full elbow extension.  Elbow flexion to 130 degrees.  Demonstrates active wrist and finger range of motion.  Thumb opposition intact.  Palmar abduction of the thumb intact.  Sensation intact to the axillary, median, radial, and ulnar nerve distributions.  Palpable radial pulse.    Procedures    Imaging Results (Most Recent)     None               Assessment and Plan    Diagnoses and all orders for this visit:    1. Tear of left glenoid labrum, subsequent encounter (Primary)  -     Ambulatory Referral to Physical Therapy POST OP, Ortho    2. S/P arthroscopy of left shoulder  -     Ambulatory Referral to Physical Therapy POST OP, Ortho        Alok Bedoya presents today for 2-week postop left shoulder arthroscopic labral repair with excision of a paralabral cyst, subacromial decompression, distal clavicle excision performed by Dr. Caballero on 3/20/2022.  Sutures removed today without complications.  Arthroscopic portals are well-healing without any active drainage or redness noted.  No concerning signs of complications.  He denies fever or chills.  Incision site care was reviewed today. Patient instructed not to soak or submerge incision. Do not apply any lotions, creams, or ointments to the incision at this time.  Patient will continue with sling full-time aside from working on range of motion exercises for a total of 6 weeks.  We discussed the importance of gradually working on range of motion exercises and formal physical therapy.  Continue with home exercises working on elbow and wrist range of motion as well as gentle shoulder range of motion.  Patient understood and agreed.  Formal physical therapy was ordered today.  He is instructed not to do any lifting,  pushing, pulling at this time.  Continue to take ibuprofen as needed.  Work note was written today for patient to remain off work.  Patient will follow up in 3 weeks for reevaluation.  No new x-rays needed at next visit.    Call or return if symptoms worsen or patient has any concerns.         Follow Up   Return in about 3 weeks (around 4/13/2022).  Patient was given instructions and counseling regarding his condition or for health maintenance advice. Please see specific information pulled into the AVS if appropriate.     Milvia Mera PA-C  03/23/22  12:21 EDT

## 2022-04-07 ENCOUNTER — TREATMENT (OUTPATIENT)
Dept: PHYSICAL THERAPY | Facility: CLINIC | Age: 32
End: 2022-04-07

## 2022-04-07 DIAGNOSIS — R29.898 WEAKNESS OF SHOULDER: ICD-10-CM

## 2022-04-07 DIAGNOSIS — Z98.890 S/P ARTHROSCOPY OF LEFT SHOULDER: ICD-10-CM

## 2022-04-07 DIAGNOSIS — M25.60 LIMITED JOINT RANGE OF MOTION (ROM): ICD-10-CM

## 2022-04-07 DIAGNOSIS — S43.432D TEAR OF LEFT GLENOID LABRUM, SUBSEQUENT ENCOUNTER: Primary | ICD-10-CM

## 2022-04-07 PROCEDURE — 97161 PT EVAL LOW COMPLEX 20 MIN: CPT | Performed by: PHYSICAL THERAPIST

## 2022-04-07 NOTE — PROGRESS NOTES
Physical Therapy Initial Evaluation and Plan of Care      Patient: Alok Bedoya   : 1990  Diagnosis/ICD-10 Code:  Tear of left glenoid labrum, subsequent encounter [S43.432D]  Referring practitioner: Milvia Mera PA-C  Date of Initial Visit: 2022  Today's Date: 2022  Patient seen for 1 sessions         Visit Diagnoses:    ICD-10-CM ICD-9-CM   1. Tear of left glenoid labrum, subsequent encounter  S43.432D V58.89     840.8   2. S/P arthroscopy of left shoulder  Z98.890 V45.89   3. Limited joint range of motion (ROM)  M25.60 719.50   4. Weakness of shoulder  R29.898 719.61       Subjective Questionnaire: QuickDASH: 77.27      Subjective Evaluation    History of Present Illness  Mechanism of injury: Pt presents to [facility] to be evaluated S/P L shoulder labral repair, SAD/DCE 3/8/22.    Pt relates min pain at rest, wearing sling most of the time.  Relates moving hand and wrist often, taking sling off and moving elbow with assistance of R hand.     States has difficulty sleeping due to shoulder.   Using ice intermitantly.     RTD 22.    Pt relates precautions and is aware of prohibited activities.               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    Hand dominance: right    Patient Goals  Patient goals for therapy: return to sport/leisure activities, return to work, independence with ADLs/IADLs, increased strength, decreased pain and increased motion             Objective           General Comments     Shoulder Comments   Pt presents with L shoulder in sling.    Hand AROM WNL,  strength WFL.   Wrist AROM WFL.    Elbow AROM limited 70% with c/o discomfort at shoulder ( due to labral repair).  Instructed pt in proper UE positioning to move  elbow P/AA with assistance of R hand.    L shoulder:   PROM: seated: flexion 15 degrees, abd 15 degrees, ER aprox 20 degrees (belly toward neutral)    Portal closed, well healed, mild adhesions.     POSTURE: rounded shoulders, slouched. Able to correct with VC.          Assessment & Plan     Assessment  Impairments: abnormal or restricted ROM, activity intolerance, impaired physical strength, lacks appropriate home exercise program and pain with function  Functional Limitations: carrying objects, lifting, sleeping, pulling, pushing, uncomfortable because of pain, moving in bed, reaching behind back, reaching overhead and unable to perform repetitive tasks  Assessment details: Pt presents with decreased functional capacity S/P L shoulder labral repair, SAD/DCE 3/8/22.    Pt relates/ demonstrates:   -inability/ prohibited to actively elevate arm into flexion / abduction, IR, ER,  -shoulder girdle weakness,   -poor scapular positioning/ stabilization   -limitation with lifting , pushing, pulling, carrying, due to shoulder precautions,  -limitation performing hobbies/work/ community activities, due to shoulder precautions,    -interference with sleep,   -compensation necessary to perform ADLs,   -a lack of  appropriate progressive HEP.     Pt would benefit from skilled physical  therapy intervention to address the above mentioned deficits.     Time was spent discussing anatomy, physiology, pathology, and  therapy treatment plan. Discussed posture and motions to avoid.      Pt related understanding of pathology, precautions,  and therapy treatment plan and progression.       Pt was given WHEP of today's exercises.  Pt related understanding of precautions, progression parameters. Pt's questions and concerns were addressed throughout the session  as they arose.          Barriers to therapy: none noted at this time, may need   Prognosis: good    Goals  Plan Goals: ST weeks  Pt will demonstrate/ report:      -decreased c/o shoulder pain 2-3/10, 50-75% of the time.    -demonstrate scapular retraction with sitting/standing posture,75% of the time.    -increased shoulder PROM to flexion 85 degrees, min/no c/o pain.     -sleeping 75%, or more , of the night without waking due to shoulder pain.     -I in HEP each visit.     -improve score on QUICK DASH.       LT weeks  Pt will report/ demonstrate:     -improved functional use of L shoulder, with appropriate scapular stabilization, to perform ADLs, hobbies , work duties with min/ no limitation .    -demonstrate proper body mechanics with moving 10# lateral across table, forward/back, and lifting    - L shoulder strength/scapular stabilization4-4+/5 to perform home tasks and community activities as needed, with min/no limitation due to shoulder weakness.     -increased B scapular stabilization with functional use of  UE.    -resumption of hobbies / work with min limitations due to shoulder pain/weakness.      -improve score on QUICK DASH     -be I in final HEP and progression parameters to perform after formal physical therapy has been discontinued    Plan  Therapy options: will be seen for skilled therapy services  Planned modality interventions: ultrasound, dry needling, high voltage pulsed current (pain management), cryotherapy and thermotherapy (hydrocollator packs)  Other planned modality interventions: any  other modality as indicated to benefit the pt  Planned therapy interventions: flexibility, functional ROM exercises, home exercise program, manual therapy, abdominal trunk stabilization, postural training, soft tissue mobilization, strengthening, stretching, therapeutic activities and neuromuscular re-education  Other planned therapy interventions: any other intervention deemed necessary to benefit the pt  Frequency: 2x week  Duration in weeks: 12  Treatment plan discussed with: patient and PTA  Plan details: Continue therapy involving  education,  -stretching,   -progressive strengthening as indicated, initiate scapular stabilization,   -shoulder girdle and trunk stabilization training,   -modalities as indicated,   -manual therapy techniques as indicated,   -progressive HEP instruction.    Next visit, reassess tolerance to current exercises and modify and/or progress as indicated.     Pt evaluated in [facility] today. Relates is living in [place] since shoulder surgery. May continue therapy in a [place] clinic.         Planned interventions:  Any other modality and/or intervention deemed necessary to benefit the patient.        History # of Personal Factors and/or Comorbidities: LOW (0)  Examination of Body System(s): # of elements: HIGH (4+)  Clinical Presentation: STABLE   Clinical Decision Making: LOW       Timed:  Manual Therapy:    10     mins  80361;  Therapeutic Exercise:    10     mins  78226;     Neuromuscular Issac:        mins  00030;    Therapeutic Activity:          mins  10137;     Gait Training:           mins  25588;     Ultrasound:          mins  67370;    Canalith Repos           ___  mins  21513      Untimed:  Electrical Stimulation:         mins  45354 ( );  Mechanical Traction:         mins  91442;     Low Complexity Evaluation:                      25     mins  80856;  Moderate Complexity Evaluation:                  mins  69421;   High Complexity Evaluation:                          mins  33380       Timed Treatment:   20   mins   Total Treatment:     45   mins      DATE TREATMENT INITIATED: 4/7/2022              PT SIGNATURE: Josselin Burgos PT   [place] License: [id number]  This document has been electronically signed by Josselin Burgos PT on April 7, 2022 16:47 EDT        Initial Certification    Certification Period: 4/7/2022 thru 7/5/2022  I certify that the therapy services are furnished while this patient is under my care.  The services outlined above are required by this patient, and will be reviewed every 90  days.     PHYSICIAN: Ede, Milvia, PA-C   NPI: [id number]      PHYSICIAN PRINT NAME: ______________________________________________      PHYSICIAN SIGNATURE: ______________________________________________         DATE:________________________________

## 2022-04-13 ENCOUNTER — TREATMENT (OUTPATIENT)
Dept: PHYSICAL THERAPY | Facility: CLINIC | Age: 32
End: 2022-04-13

## 2022-04-13 ENCOUNTER — OFFICE VISIT (OUTPATIENT)
Dept: ORTHOPEDIC SURGERY | Facility: CLINIC | Age: 32
End: 2022-04-13

## 2022-04-13 VITALS — HEART RATE: 97 BPM | HEIGHT: 75 IN | WEIGHT: 150 LBS | OXYGEN SATURATION: 97 % | BODY MASS INDEX: 18.65 KG/M2

## 2022-04-13 DIAGNOSIS — S43.432D TEAR OF LEFT GLENOID LABRUM, SUBSEQUENT ENCOUNTER: ICD-10-CM

## 2022-04-13 DIAGNOSIS — R29.898 WEAKNESS OF SHOULDER: ICD-10-CM

## 2022-04-13 DIAGNOSIS — M25.60 LIMITED JOINT RANGE OF MOTION (ROM): ICD-10-CM

## 2022-04-13 DIAGNOSIS — Z98.890 S/P ARTHROSCOPY OF LEFT SHOULDER: Primary | ICD-10-CM

## 2022-04-13 PROCEDURE — 99024 POSTOP FOLLOW-UP VISIT: CPT | Performed by: PHYSICIAN ASSISTANT

## 2022-04-13 PROCEDURE — 97140 MANUAL THERAPY 1/> REGIONS: CPT | Performed by: PHYSICAL THERAPIST

## 2022-04-13 PROCEDURE — 97110 THERAPEUTIC EXERCISES: CPT | Performed by: PHYSICAL THERAPIST

## 2022-04-13 NOTE — PROGRESS NOTES
"Chief Complaint  Follow-up of the Left Shoulder    Subjective          Alok Bedoya presents to [facility] for   History of Present Illness    Alok presents today for follow-up of his left shoulder.  Patient is 5 weeks postop left shoulder arthroscopy labral repair.  Today, he denies pain.  He states that he has been attending formal physical therapy and noticed improvements in his range of motion.  He is currently taking ibuprofen as needed for his pain.  He denies complications with his incision sites.  He denies any heavy lifting, pushing, or pulling.  He denies new injuries.  Patient continues to use ice and states this provides significant relief.    Allergies   Allergen Reactions   • Prednisone Myalgia     AND REPORTS CAUSED HIS THROAT DRY OUT AND NOSE TO BLEED        Social History     Socioeconomic History   • Marital status: Single   Tobacco Use   • Smoking status: Former Smoker     Types: Cigarettes   • Smokeless tobacco: Never Used   • Tobacco comment: OVER A YEAR AGO   Vaping Use   • Vaping Use: Some days   • Substances: Nicotine   • Devices: Pre-filled pod   Substance and Sexual Activity   • Alcohol use: Not Currently   • Drug use: Never   • Sexual activity: Defer        I reviewed the patient's chief complaint, history of present illness, review of systems, past medical history, surgical history, family history, social history, medications, and allergy list.     REVIEW OF SYSTEMS    Constitutional: Denies fevers, chills, weight loss  Cardiovascular: Denies chest pain, shortness of breath  Skin: Denies rashes, acute skin changes  Neurologic: Denies headache, loss of consciousness  MSK: Left shoulder      Objective   Vital Signs:   Pulse 97   Ht 190.5 cm (75\")   Wt 68 kg (150 lb)   SpO2 97%   BMI 18.75 kg/m²     Body mass index is 18.75 kg/m².    Physical Exam    General: Alert. No acute distress.   Left upper extremity: Well-healed arthroscopy portals.  No " concerning signs of infection.  Sensation intact to the axillary, median, radial, ulnar nerve distributions.  Full elbow extension and flexion.  Thumb opposition intact.  Palmar abduction of the thumb intact.  Demonstrates active wrist range of motion.  Full finger extension and flexion.  Palpable radial pulse.  No pain with glenohumeral joint range of motion.    Procedures    Imaging Results (Most Recent)     None                 Assessment and Plan    Diagnoses and all orders for this visit:    1. S/P arthroscopy of left shoulder (Primary)    2. Tear of left glenoid labrum, subsequent encounter        Alok presents today for 5-week postop left shoulder labral repair.  Incision is well-healed.  Patient directed to continue with sling over the next week and then gradually discontinue use of sling as tolerated.  He is instructed to continue with formal physical therapy as well as his home exercises.  We discussed the importance of gradual progression with range of motion.  Patient understood and agreed.  Continue take ibuprofen as needed.  Continue using ice to help with pain and inflammation.  Patient will follow up in 3 weeks for reevaluation.  No new x-rays needed at next visit.    Call or return if symptoms worsen or patient has any concerns.       Follow Up   Return in about 3 weeks (around 5/4/2022).  Patient was given instructions and counseling regarding his condition or for health maintenance advice. Please see specific information pulled into the AVS if appropriate.     Milvia Mera PA-C  04/13/22  11:56 EDT

## 2022-04-13 NOTE — PROGRESS NOTES
Physical Therapy Daily Treatment Note      Patient: Alok Bedoya   : 1990  Referring practitioner: Milvia Mera PA-C  Date of Initial Visit: Type: THERAPY  Noted: 2022  Today's Date: 2022  Patient seen for 2 sessions           Visit Diagnoses:    ICD-10-CM ICD-9-CM   1. S/P arthroscopy of left shoulder  Z98.890 V45.89   2. Tear of left glenoid labrum, subsequent encounter  S43.432D V58.89     840.8   3. Limited joint range of motion (ROM)  M25.60 719.50   4. Weakness of shoulder  R29.898 719.61       Subjective Evaluation    History of Present Illness  Mechanism of injury: Marco saw the MD today, he can take his sling off after next week.  He continues to have tightness/pain along the lateral arm and the front of his shoulder.  He knows his posture could be better.               Objective   See Exercise, Manual, and Modality Logs for complete treatment.       Assessment & Plan     Assessment  Impairments: abnormal or restricted ROM, activity intolerance, impaired physical strength, lacks appropriate home exercise program and pain with function  Functional Limitations: carrying objects, lifting, sleeping, pulling, pushing, uncomfortable because of pain, moving in bed, reaching behind back, reaching overhead and unable to perform repetitive tasks  Assessment details: 5 weeks post op     Progressed today with PROM and manual work today to help with tightness.  Sling looked to be wearing appropriately.  He had some tightness with manual, but he was able to tolerate stretching.  Added in light submax isometrics in neutral today, painfree.  Reviewed and updated HEP.    Pt noted he would ice at home.       Prognosis: good    Goals  Plan Goals: ST weeks  Pt will demonstrate/ report:     -decreased c/o shoulder pain 2-3/10, 50-75% of the time.    -demonstrate scapular retraction with sitting/standing posture,75% of the time.    -increased shoulder PROM to flexion 85 degrees, min/no c/o pain.      -sleeping 75%, or more , of the night without waking due to shoulder pain.     -I in HEP each visit.     -improve score on QUICK DASH.       LT weeks  Pt will report/ demonstrate:     -improved functional use of L shoulder, with appropriate scapular stabilization, to perform ADLs, hobbies , work duties with min/ no limitation .    -demonstrate proper body mechanics with moving 10# lateral across table, forward/back, and lifting    - L shoulder strength/scapular stabilization4-4+/5 to perform home tasks and community activities as needed, with min/no limitation due to shoulder weakness.     -increased B scapular stabilization with functional use of  UE.    -resumption of hobbies / work with min limitations due to shoulder pain/weakness.      -improve score on QUICK DASH     -be I in final HEP and progression parameters to perform after formal physical therapy has been discontinued    Plan  Therapy options: will be seen for skilled therapy services  Planned modality interventions: ultrasound, dry needling, high voltage pulsed current (pain management), cryotherapy and thermotherapy (hydrocollator packs)  Other planned modality interventions: any  other modality as indicated to benefit the pt  Planned therapy interventions: flexibility, functional ROM exercises, home exercise program, manual therapy, abdominal trunk stabilization, postural training, soft tissue mobilization, strengthening, stretching, therapeutic activities and neuromuscular re-education  Other planned therapy interventions: any other intervention deemed necessary to benefit the pt  Frequency: 2x week  Duration in weeks: 12  Treatment plan discussed with: patient and PTA  Plan details: Continue therapy to help with L shoulder ROM, strength, scapular and shoulder stabilization, postural awareness/cueing/strengthening, progress per protocol.      At this time, pt is going to continue with therapy at the [facility].           Progress per  Plan of Care and Progress strengthening /stabilization /functional activity           Timed:  Manual Therapy:    23     mins  17893;  Therapeutic Exercise:    17     mins  86427;     Neuromuscular Issac:        mins  60629;    Therapeutic Activity:          mins  23769;     Gait Training:           mins  32691;     Ultrasound:          mins  10039;    Canalith Repos           ___  mins  08043      Untimed:  Electrical Stimulation:        mins  69544 ( );  Mechanical Traction:         mins  32845;   Dry Needling:                     mins self pay       Timed Treatment:   40   mins   Total Treatment:     42   mins      Lesley Kay PT, DPT  [place] License #: [id number]

## 2022-04-21 ENCOUNTER — TELEPHONE (OUTPATIENT)
Dept: PHYSICAL THERAPY | Facility: CLINIC | Age: 32
End: 2022-04-21

## 2022-05-05 ENCOUNTER — TREATMENT (OUTPATIENT)
Dept: PHYSICAL THERAPY | Facility: CLINIC | Age: 32
End: 2022-05-05

## 2022-05-05 DIAGNOSIS — Z98.890 S/P ARTHROSCOPY OF LEFT SHOULDER: Primary | ICD-10-CM

## 2022-05-05 PROCEDURE — 97164 PT RE-EVAL EST PLAN CARE: CPT | Performed by: PHYSICAL THERAPIST

## 2022-05-05 PROCEDURE — 97530 THERAPEUTIC ACTIVITIES: CPT | Performed by: PHYSICAL THERAPIST

## 2022-05-05 PROCEDURE — 97110 THERAPEUTIC EXERCISES: CPT | Performed by: PHYSICAL THERAPIST

## 2022-05-05 PROCEDURE — 97140 MANUAL THERAPY 1/> REGIONS: CPT | Performed by: PHYSICAL THERAPIST

## 2022-05-05 NOTE — PROGRESS NOTES
Physical Therapy Daily Progress Note/ Re-assessment     TOTAL TIME: 70 MINUTES    Aloktelly Turnerdavid reports: patient is transitioning therapy from [place] to here- has moved to [place]; has not been to therapy since 4/13/22; is not using the arm; saw the doctor about a week ago and said he could take the sling off; does not plan on going back to surgeon, does not feel like he needs to    Objective   See Exercise, Manual, and Modality Logs for complete treatment.     Left shoulder AROM:  Lrvyzet=267 degrees, no pain  Abd= 60 degrees   ER=70 degrees  IR= L2    Left shoulder MMT grossly 4-/5    THERAPEUTIC EXERCISES/ACTIVITIES ADDED TODAY: see flow sheets     Manual: left GH mobilizations x 10 minutes     Ice to left shoulder x 10 minutes after therapy    Assessment/Plan  Patient reports to therapy after a 3 week lapse due to moving to [place] and transferring services to our location; his shoulder is causing minimal pain, but he admits to using it very little to protect it; he has excellent PROM of the left shoulder; needs PT to progress through protocol for full functional restoration     STG's: 4 weeks  1. IND with HEP  2. Patient to display full AROM of left shoulder without pain or compensation  3. Patient to improve Quick Dash score by 1 MCID  4. Patient to display strength 4/5 left shoulder    Progress per Plan of Care and Progress strengthening /stabilization /functional activity           Manual Therapy:    10     mins  46785;  Therapeutic Exercise:    15     mins  87904;     Neuromuscular Issac:        mins  16552;    Therapeutic Activity:     15     mins  99075;     Gait Training:           mins  85903;     Ultrasound:          mins  31750;    Electrical Stimulation:         mins  91526 ( );  Dry Needling          mins self-pay    Timed Treatment:   40   mins   Total Treatment:     70   mins    TIFF Rangel, PT  Physical Therapist

## 2022-05-11 ENCOUNTER — TREATMENT (OUTPATIENT)
Dept: PHYSICAL THERAPY | Facility: CLINIC | Age: 32
End: 2022-05-11

## 2022-05-11 DIAGNOSIS — Z98.890 S/P ARTHROSCOPY OF LEFT SHOULDER: Primary | ICD-10-CM

## 2022-05-11 PROCEDURE — 97110 THERAPEUTIC EXERCISES: CPT | Performed by: PHYSICAL THERAPIST

## 2022-05-11 PROCEDURE — 97530 THERAPEUTIC ACTIVITIES: CPT | Performed by: PHYSICAL THERAPIST

## 2022-05-11 PROCEDURE — 97140 MANUAL THERAPY 1/> REGIONS: CPT | Performed by: PHYSICAL THERAPIST

## 2022-05-13 ENCOUNTER — TRANSCRIBE ORDERS (OUTPATIENT)
Dept: PHYSICAL THERAPY | Facility: CLINIC | Age: 32
End: 2022-05-13

## 2022-05-13 ENCOUNTER — TREATMENT (OUTPATIENT)
Dept: PHYSICAL THERAPY | Facility: CLINIC | Age: 32
End: 2022-05-13

## 2022-05-13 ENCOUNTER — TELEPHONE (OUTPATIENT)
Dept: ORTHOPEDIC SURGERY | Facility: CLINIC | Age: 32
End: 2022-05-13

## 2022-05-13 DIAGNOSIS — Z98.890 S/P ARTHROSCOPY OF LEFT SHOULDER: Primary | ICD-10-CM

## 2022-05-13 DIAGNOSIS — S43.432D TEAR OF LEFT GLENOID LABRUM, SUBSEQUENT ENCOUNTER: Primary | ICD-10-CM

## 2022-05-13 DIAGNOSIS — Z98.890 S/P ARTHROSCOPY OF LEFT SHOULDER: ICD-10-CM

## 2022-05-13 PROCEDURE — 97110 THERAPEUTIC EXERCISES: CPT | Performed by: PHYSICAL THERAPIST

## 2022-05-13 PROCEDURE — 97530 THERAPEUTIC ACTIVITIES: CPT | Performed by: PHYSICAL THERAPIST

## 2022-05-13 PROCEDURE — 97140 MANUAL THERAPY 1/> REGIONS: CPT | Performed by: PHYSICAL THERAPIST

## 2022-05-13 NOTE — TELEPHONE ENCOUNTER
Hub staff attempted to follow warm transfer process and was unsuccessful     Caller: SILVINA    Relationship to patient:  PRIMARY CARE     Best call back number: [phone]    Patient is needing: NEED FORM FOR PT'S DISABILITY SENT TO THEIR OFFICE. SHE SPOKE W/ SOMEONE REGARDING THIS YESTERDAY (5.12.22). SHE REQUESTED TO SPEAK W/ . WARM TRANSFER WAS UNSUCCESSFUL.

## 2022-05-13 NOTE — TELEPHONE ENCOUNTER
THE NUMBER BELOW IS NOT CORRECT, TRIED SEVERAL ATTEMPTS TO CALL IT. I DON'T KNOW WHO THEY TALKED TO YESTERDAY BUT IT WAS NOT ME.

## 2022-05-13 NOTE — PROGRESS NOTES
Physical Therapy Daily Progress Note    TOTAL TIME: 65 MINUTES    Alok Bedoya reports: no complaints; arm felt good after last session and new exercises       Objective   See Exercise, Manual, and Modality Logs for complete treatment.     THERAPEUTIC EXERCISES/ACTIVITIES ADDED TODAY: shoulder isometrics flexion, ADD, ER, extension;   supine pull aparts red tband, supine horizontal abd red band , SL ER 1#, prone shoulder extension with 1#, prone row with 1#    Manual: left GH mobilizations x 10 minutes    Ice to left shoulder x 10 minutes    Assessment/Plan  Full, pain-free PROM left shoulder; performed very well with new exercises today; continue to advance protocol       Progress per Plan of Care and Progress strengthening /stabilization /functional activity           Manual Therapy:    10     mins  79549;  Therapeutic Exercise:    30     mins  45013;     Neuromuscular Issac:        mins  11311;    Therapeutic Activity:     15     mins  47193;     Gait Training:           mins  35298;     Ultrasound:          mins  47254;    Electrical Stimulation:         mins  78695 ( );  Dry Needling          mins self-pay    Timed Treatment:   55   mins   Total Treatment:     65   mins    TIFF Rangel PT  Physical Therapist

## 2022-05-18 ENCOUNTER — TREATMENT (OUTPATIENT)
Dept: PHYSICAL THERAPY | Facility: CLINIC | Age: 32
End: 2022-05-18

## 2022-05-18 DIAGNOSIS — Z98.890 S/P ARTHROSCOPY OF LEFT SHOULDER: Primary | ICD-10-CM

## 2022-05-18 PROCEDURE — 97110 THERAPEUTIC EXERCISES: CPT | Performed by: PHYSICAL THERAPIST

## 2022-05-18 PROCEDURE — 97140 MANUAL THERAPY 1/> REGIONS: CPT | Performed by: PHYSICAL THERAPIST

## 2022-05-18 PROCEDURE — 97530 THERAPEUTIC ACTIVITIES: CPT | Performed by: PHYSICAL THERAPIST

## 2022-05-18 NOTE — PROGRESS NOTES
Physical Therapy Daily Progress Note    TOTAL TIME: 65 MINUTES    Alok Turnerchass reports: shoulder has been feeling good ; still being careful with it at home      Objective   See Exercise, Manual, and Modality Logs for complete treatment.     THERAPEUTIC EXERCISES/ACTIVITIES ADDED TODAY: see flow sheets    Assessment/Plan  Very good PROM left shoulder; advancing strength exercises without pain; needs continued PT to improve functional mobility and strength    Progress per Plan of Care and Progress strengthening /stabilization /functional activity           Manual Therapy:    10     mins  41719;  Therapeutic Exercise:    30     mins  20647;     Neuromuscular Issac:        mins  59857;    Therapeutic Activity:     15     mins  76150;     Gait Training:           mins  72452;     Ultrasound:          mins  09660;    Electrical Stimulation:         mins  74300 ( );  Dry Needling          mins self-pay    Timed Treatment:   55   mins   Total Treatment:     65   mins    TIFF Rangel PT  Physical Therapist

## 2022-05-20 ENCOUNTER — TREATMENT (OUTPATIENT)
Dept: PHYSICAL THERAPY | Facility: CLINIC | Age: 32
End: 2022-05-20

## 2022-05-20 DIAGNOSIS — Z98.890 S/P ARTHROSCOPY OF LEFT SHOULDER: Primary | ICD-10-CM

## 2022-05-20 PROCEDURE — 97530 THERAPEUTIC ACTIVITIES: CPT | Performed by: PHYSICAL THERAPIST

## 2022-05-20 PROCEDURE — 97110 THERAPEUTIC EXERCISES: CPT | Performed by: PHYSICAL THERAPIST

## 2022-05-20 PROCEDURE — 97140 MANUAL THERAPY 1/> REGIONS: CPT | Performed by: PHYSICAL THERAPIST

## 2022-05-20 NOTE — PROGRESS NOTES
Physical Therapy Daily Progress Note    TOTAL TIME: 65 MINUTES    Alok Elke reports: shoulder is a little sore, I think I slept on in awkwardly       Objective   See Exercise, Manual, and Modality Logs for complete treatment.     THERAPEUTIC EXERCISES/ACTIVITIES ADDED TODAY: scaption 2# 3x10, see flow sheets, added IR to isometrics     Manual: see flow sheets    Assessment/Plan  Patient is making excellent progress with functional mobility and strength; needs continued PT to improve and progress through protocol    Progress per Plan of Care and Progress strengthening /stabilization /functional activity           Manual Therapy:    10     mins  56445;  Therapeutic Exercise:    30     mins  79041;     Neuromuscular Issac:        mins  79738;    Therapeutic Activity:     15     mins  95750;     Gait Training:           mins  67960;     Ultrasound:          mins  03391;    Electrical Stimulation:         mins  11905 ( );  Dry Needling          mins self-pay    Timed Treatment:   55   mins   Total Treatment:     65   mins    TIFF Rangel, PT  Physical Therapist

## 2022-06-02 ENCOUNTER — TREATMENT (OUTPATIENT)
Dept: PHYSICAL THERAPY | Facility: CLINIC | Age: 32
End: 2022-06-02

## 2022-06-02 DIAGNOSIS — Z98.890 S/P ARTHROSCOPY OF LEFT SHOULDER: Primary | ICD-10-CM

## 2022-06-02 PROCEDURE — 97110 THERAPEUTIC EXERCISES: CPT | Performed by: PHYSICAL THERAPIST

## 2022-06-02 PROCEDURE — 97140 MANUAL THERAPY 1/> REGIONS: CPT | Performed by: PHYSICAL THERAPIST

## 2022-06-02 PROCEDURE — 97530 THERAPEUTIC ACTIVITIES: CPT | Performed by: PHYSICAL THERAPIST

## 2022-06-02 NOTE — PROGRESS NOTES
Physical Therapy Daily Progress Note    TOTAL TIME: 65 MINUTES    Alok Bedoya reports: no new complaints; slight pain with certain movements of arm such as abduction with flexed elbow      Objective   See Exercise, Manual, and Modality Logs for complete treatment.     THERAPEUTIC EXERCISES/ACTIVITIES ADDED TODAY: green tband scaption    Manual: left GH mobilizations     Ice x 10 minutes after therapy     Assessment/Plan  Patient is progressing very well s/p arthroscopy; full PROM/AROM; needs continued strength progression for full RTW as      Progress per Plan of Care and Progress strengthening /stabilization /functional activity           Manual Therapy:    10     mins  01638;  Therapeutic Exercise:    30     mins  25395;     Neuromuscular Issac:        mins  92082;    Therapeutic Activity:     15     mins  76390;     Gait Training:           mins  75791;     Ultrasound:          mins  38116;    Electrical Stimulation:         mins  71808 ( );  Dry Needling          mins self-pay    Timed Treatment:   55   mins   Total Treatment:     65   mins    TIFF Rangel PT  Physical Therapist

## 2022-07-05 ENCOUNTER — OFFICE VISIT (OUTPATIENT)
Dept: ORTHOPEDIC SURGERY | Facility: CLINIC | Age: 32
End: 2022-07-05

## 2022-07-05 VITALS
DIASTOLIC BLOOD PRESSURE: 78 MMHG | SYSTOLIC BLOOD PRESSURE: 121 MMHG | WEIGHT: 146.8 LBS | BODY MASS INDEX: 19.46 KG/M2 | HEIGHT: 73 IN

## 2022-07-05 DIAGNOSIS — S49.92XA TRAUMATIC INJURY OF LEFT SHOULDER: ICD-10-CM

## 2022-07-05 DIAGNOSIS — M25.512 ACUTE PAIN OF LEFT SHOULDER: Primary | ICD-10-CM

## 2022-07-05 DIAGNOSIS — S80.02XA TRAUMATIC HEMATOMA OF LEFT KNEE, INITIAL ENCOUNTER: ICD-10-CM
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
## 2022-07-05 PROCEDURE — 99214 OFFICE O/P EST MOD 30 MIN: CPT | Performed by: PHYSICIAN ASSISTANT

## 2022-07-05 RX ORDER — LIDOCAINE 5 %
ADHESIVE PATCH, MEDICATED TOPICAL
COMMUNITY
Start: 2022-06-24

## 2022-07-05 RX ORDER — ASPIRIN 81 MG
TABLET,CHEWABLE ORAL
COMMUNITY
Start: 2022-06-24

## 2022-07-05 RX ORDER — METHOCARBAMOL 500 MG/1
TABLET, FILM COATED ORAL
COMMUNITY
Start: 2022-06-24

## 2022-07-05 RX ORDER — FOLIC ACID 1 MG/1
TABLET ORAL
COMMUNITY
Start: 2022-06-24

## 2022-07-05 RX ORDER — LANOLIN ALCOHOL/MO/W.PET/CERES
CREAM (GRAM) TOPICAL
COMMUNITY
Start: 2022-06-24

## 2022-07-05 RX ORDER — AMOXICILLIN 250 MG
2 CAPSULE ORAL
COMMUNITY
Start: 2022-06-24 | End: 2022-07-08

## 2022-07-05 RX ORDER — NALOXONE HYDROCHLORIDE 4 MG/.1ML
SPRAY NASAL
COMMUNITY
Start: 2022-06-24

## 2022-07-05 RX ORDER — GABAPENTIN 300 MG/1
CAPSULE ORAL
COMMUNITY
Start: 2022-06-24

## 2022-07-05 RX ORDER — PRENATAL VIT 27,CALC/IRON/FA 60 MG-1 MG
1 TABLET ORAL DAILY
COMMUNITY
Start: 2022-06-24

## 2022-07-05 RX ORDER — IBUPROFEN 200 MG
800 TABLET ORAL DAILY
COMMUNITY

## 2022-07-05 RX ORDER — GINSENG 100 MG
CAPSULE ORAL
COMMUNITY
Start: 2022-06-24
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
## 2022-08-11 ENCOUNTER — HOSPITAL ENCOUNTER (OUTPATIENT)
Dept: MRI IMAGING | Facility: HOSPITAL | Age: 32
End: 2022-08-11

## 2022-08-16 ENCOUNTER — DOCUMENTATION (OUTPATIENT)
Dept: PHYSICAL THERAPY | Facility: CLINIC | Age: 32
End: 2022-08-16

## 2022-08-16 DIAGNOSIS — Z98.890 S/P ARTHROSCOPY OF LEFT SHOULDER: Primary | ICD-10-CM

## 2022-08-16 NOTE — PROGRESS NOTES
Discharge Summary from Physical Therapy        Patient Information  Alok Bedoya  1990  Diagnosis/ICD - 10 Code:  S/P arthroscopy of left shoulder [Z98.890]  Referring practitioner: No ref. provider found  Date of initial visit: 8/16/2022  Today's date: 8/16/2022  Patient was seen for Visit count could not be calculated. Make sure you are using a visit which is associated with an episode. sessions      Visit Diagnoses:    ICD-10-CM ICD-9-CM   1. S/P arthroscopy of left shoulder  Z98.890 V45.89         Discharge Status of Patient: See PT Note dated 6/2/22    Goals: Partially Met    Discharge Plan: Continue with current home exercise program as instructed                      PT SIGNATURE: Josselin Burgos, PT MS,PT  [place] License: [id number]    This document has been electronically signed by Josselin Burgos, PT on August 16, 2022 13:59 EDT

## 2022-08-22 ENCOUNTER — TELEPHONE (OUTPATIENT)
Dept: ORTHOPEDIC SURGERY | Facility: CLINIC | Age: 32
End: 2022-08-22

## 2022-08-22 ENCOUNTER — HOSPITAL ENCOUNTER (OUTPATIENT)
Dept: MRI IMAGING | Facility: HOSPITAL | Age: 32
Discharge: HOME | End: 2022-08-22
Admitting: PHYSICIAN ASSISTANT

## 2022-08-22 DIAGNOSIS — M25.512 ACUTE PAIN OF LEFT SHOULDER: ICD-10-CM

## 2022-08-22 DIAGNOSIS — S49.92XA TRAUMATIC INJURY OF LEFT SHOULDER: ICD-10-CM
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
## 2022-08-22 PROCEDURE — 73221 MRI JOINT UPR EXTREM W/O DYE: CPT

## 2022-08-22 NOTE — TELEPHONE ENCOUNTER
Provider: ROXIE ROBINSON    Caller:MOTHER VALERI     Relationship to Patient: MOTHER    Phone Number:  [phone]    Reason for Call: PT. LAST SEEN ON 07/05/22 FOR LEFT KNEE.  HE HAS MRI APPT. TODAY  AT HOSPITAL.  WHEN PT. GOT UP THIS MORNING- HE BENT HIS KNEE- THERE IS TISSUE AND FLUID COMING OUT OF WOUND OF KNEE.  ASKING IF HE CAN BE SEEN OR WHAT ROXIE ADVISES.  PLEASE CALL TO ADVISE.       When was the patient last seen: 07/05/22

## 2024-10-02 ENCOUNTER — APPOINTMENT (OUTPATIENT)
Dept: GENERAL RADIOLOGY | Facility: HOSPITAL | Age: 34
End: 2024-10-02
Payer: MEDICAID

## 2024-10-02 ENCOUNTER — HOSPITAL ENCOUNTER (EMERGENCY)
Facility: HOSPITAL | Age: 34
Discharge: HOME | End: 2024-10-02
Attending: EMERGENCY MEDICINE
Payer: MEDICAID

## 2024-10-02 VITALS
TEMPERATURE: 98.2 F | OXYGEN SATURATION: 94 % | HEIGHT: 73 IN | RESPIRATION RATE: 18 BRPM | DIASTOLIC BLOOD PRESSURE: 84 MMHG | HEART RATE: 96 BPM | SYSTOLIC BLOOD PRESSURE: 130 MMHG | WEIGHT: 174 LBS | BODY MASS INDEX: 23.06 KG/M2

## 2024-10-02 DIAGNOSIS — J98.4 CHRONIC LUNG DISEASE: ICD-10-CM

## 2024-10-02 DIAGNOSIS — J40 BRONCHITIS: Primary | ICD-10-CM

## 2024-10-02 LAB
ALBUMIN SERPL-MCNC: 4.7 G/DL (ref 3.5–5.2)
ALBUMIN/GLOB SERPL: 2 G/DL
ALP SERPL-CCNC: 59 U/L (ref 39–117)
ALT SERPL W P-5'-P-CCNC: 36 U/L (ref 1–41)
ANION GAP SERPL CALCULATED.3IONS-SCNC: 12.3 MMOL/L (ref 5–15)
AST SERPL-CCNC: 69 U/L (ref 1–40)
BASOPHILS # BLD AUTO: 0.04 10*3/MM3 (ref 0–0.2)
BASOPHILS NFR BLD AUTO: 0.3 % (ref 0–1.5)
BILIRUB SERPL-MCNC: 0.3 MG/DL (ref 0–1.2)
BUN SERPL-MCNC: 11 MG/DL (ref 6–20)
BUN/CREAT SERPL: 10.4 (ref 7–25)
CALCIUM SPEC-SCNC: 9.4 MG/DL (ref 8.6–10.5)
CHLORIDE SERPL-SCNC: 107 MMOL/L (ref 98–107)
CO2 SERPL-SCNC: 24.7 MMOL/L (ref 22–29)
CREAT SERPL-MCNC: 1.06 MG/DL (ref 0.76–1.27)
DEPRECATED RDW RBC AUTO: 46.1 FL (ref 37–54)
EGFRCR SERPLBLD CKD-EPI 2021: 95 ML/MIN/1.73
EOSINOPHIL # BLD AUTO: 0.1 10*3/MM3 (ref 0–0.4)
EOSINOPHIL NFR BLD AUTO: 0.7 % (ref 0.3–6.2)
ERYTHROCYTE [DISTWIDTH] IN BLOOD BY AUTOMATED COUNT: 13.5 % (ref 12.3–15.4)
FLUAV RNA RESP QL NAA+PROBE: NOT DETECTED
FLUBV RNA RESP QL NAA+PROBE: NOT DETECTED
GLOBULIN UR ELPH-MCNC: 2.3 GM/DL
GLUCOSE SERPL-MCNC: 124 MG/DL (ref 65–99)
HCT VFR BLD AUTO: 39.4 % (ref 37.5–51)
HGB BLD-MCNC: 13.9 G/DL (ref 13–17.7)
HOLD SPECIMEN: NORMAL
HOLD SPECIMEN: NORMAL
IMM GRANULOCYTES # BLD AUTO: 0.05 10*3/MM3 (ref 0–0.05)
IMM GRANULOCYTES NFR BLD AUTO: 0.4 % (ref 0–0.5)
LYMPHOCYTES # BLD AUTO: 1.21 10*3/MM3 (ref 0.7–3.1)
LYMPHOCYTES NFR BLD AUTO: 8.6 % (ref 19.6–45.3)
MCH RBC QN AUTO: 32.6 PG (ref 26.6–33)
MCHC RBC AUTO-ENTMCNC: 35.3 G/DL (ref 31.5–35.7)
MCV RBC AUTO: 92.3 FL (ref 79–97)
MONOCYTES # BLD AUTO: 1.14 10*3/MM3 (ref 0.1–0.9)
MONOCYTES NFR BLD AUTO: 8.1 % (ref 5–12)
NEUTROPHILS NFR BLD AUTO: 11.51 10*3/MM3 (ref 1.7–7)
NEUTROPHILS NFR BLD AUTO: 81.9 % (ref 42.7–76)
NRBC BLD AUTO-RTO: 0 /100 WBC (ref 0–0.2)
NT-PROBNP SERPL-MCNC: 38 PG/ML (ref 0–450)
PLATELET # BLD AUTO: 308 10*3/MM3 (ref 140–450)
PMV BLD AUTO: 10.4 FL (ref 6–12)
POTASSIUM SERPL-SCNC: 3.5 MMOL/L (ref 3.5–5.2)
PROT SERPL-MCNC: 7 G/DL (ref 6–8.5)
RBC # BLD AUTO: 4.27 10*6/MM3 (ref 4.14–5.8)
SARS-COV-2 RNA RESP QL NAA+PROBE: NOT DETECTED
SODIUM SERPL-SCNC: 144 MMOL/L (ref 136–145)
TROPONIN T SERPL HS-MCNC: <6 NG/L
WBC NRBC COR # BLD AUTO: 14.05 10*3/MM3 (ref 3.4–10.8)
WHOLE BLOOD HOLD COAG: NORMAL
WHOLE BLOOD HOLD SPECIMEN: NORMAL

## 2024-10-02 PROCEDURE — 83880 ASSAY OF NATRIURETIC PEPTIDE: CPT

## 2024-10-02 PROCEDURE — 84484 ASSAY OF TROPONIN QUANT: CPT

## 2024-10-02 PROCEDURE — 71046 X-RAY EXAM CHEST 2 VIEWS: CPT

## 2024-10-02 PROCEDURE — 85025 COMPLETE CBC W/AUTO DIFF WBC: CPT

## 2024-10-02 PROCEDURE — 93005 ELECTROCARDIOGRAM TRACING: CPT

## 2024-10-02 PROCEDURE — 93005 ELECTROCARDIOGRAM TRACING: CPT | Performed by: EMERGENCY MEDICINE

## 2024-10-02 PROCEDURE — 87636 SARSCOV2 & INF A&B AMP PRB: CPT

## 2024-10-02 PROCEDURE — 80053 COMPREHEN METABOLIC PANEL: CPT

## 2024-10-02 PROCEDURE — 99284 EMERGENCY DEPT VISIT MOD MDM: CPT

## 2024-10-02 RX ORDER — BUSPIRONE HYDROCHLORIDE 10 MG/1
10 TABLET ORAL 3 TIMES DAILY
COMMUNITY
Start: 2024-07-21

## 2024-10-02 RX ORDER — AMOXICILLIN 500 MG/1
1000 CAPSULE ORAL 2 TIMES DAILY
Qty: 14 CAPSULE | Refills: 0 | Status: SHIPPED | OUTPATIENT
Start: 2024-10-02 | End: 2024-10-09

## 2024-10-02 RX ORDER — ALBUTEROL SULFATE 0.83 MG/ML
2.5 SOLUTION RESPIRATORY (INHALATION) EVERY 4 HOURS PRN
Qty: 3 ML | Refills: 0 | Status: SHIPPED | OUTPATIENT
Start: 2024-10-02

## 2024-10-02 RX ORDER — CETIRIZINE HYDROCHLORIDE 10 MG/1
10 TABLET ORAL DAILY
Qty: 30 TABLET | Refills: 0 | Status: SHIPPED | OUTPATIENT
Start: 2024-10-02 | End: 2024-11-01

## 2024-10-02 RX ORDER — ALBUTEROL SULFATE 90 UG/1
2 AEROSOL, METERED RESPIRATORY (INHALATION) EVERY 4 HOURS PRN
COMMUNITY
Start: 2024-09-30

## 2024-10-02 RX ORDER — SODIUM CHLORIDE 0.9 % (FLUSH) 0.9 %
10 SYRINGE (ML) INJECTION AS NEEDED
Status: DISCONTINUED | OUTPATIENT
Start: 2024-10-02 | End: 2024-10-02 | Stop reason: HOSPADM

## 2024-10-02 RX ORDER — ESCITALOPRAM OXALATE 20 MG/1
1 TABLET ORAL DAILY
COMMUNITY
Start: 2024-09-12

## 2024-10-02 NOTE — Clinical Note
[facility] EMERGENCY DEPARTMENT  [address] [address]  Phone: [phone]    Alok Bedoya was seen and treated in our emergency department on 10/2/2024.  He may return to work on 10/04/2024.         Thank you for choosing [facility].    Ean Sullivan, DO

## 2024-10-02 NOTE — Clinical Note
[facility] EMERGENCY DEPARTMENT  [address] [address]  Phone: [phone]    Nettie Bedoya accompanied Alok Bedoya to the emergency department on 10/2/2024. They may return to work on 10/03/2024.        Thank you for choosing [facility].    Ean Sullivan, DO

## 2024-10-02 NOTE — Clinical Note
[facility] EMERGENCY DEPARTMENT  [address] [address]  Phone: [phone]    Alok Bedoya was seen and treated in our emergency department on 10/2/2024.  He may return to work on 10/02/2024.         Thank you for choosing [facility].    Ean Sullivan, DO

## 2024-10-02 NOTE — DISCHARGE INSTRUCTIONS
If you notice any concerning symptoms, please return to the ER immediately. These can include but are not limited to: worsening of you condition, fevers, chills, shortness of breath, vomiting, weakness of the extremities, changes in your mental status, numbness, pale extremities, or chest pain.     Take medications as prescribed, your pharmacist may have additional recommendations concerning these medications.    For pain use ibuprofen (Motrin) or acetaminophen (Tylenol), unless prescribed medications that also contain these medications.  You can take over the counter acetaminophen or ibuprofen, please follow the directions as dosages differ. Do not take ibuprofen if you have a history of peptic ulcers, kidney disease, bariatric surgery, or are currently pregnant.  Do not take Tylenol if you have a history of liver disease or alcohol use disorder.        THANK YOU!!! From [facility] Emergency Department    On behalf of the Emergency Department staff at [facility], I would like to thank you for giving us the opportunity to address your health care needs and concerns. We hope that during your visit, our service was delivered in a professional and caring manner. Please keep [facility] in mind as we walk with you down the path to your own personal wellness. Please expect follow-up phone calls concerning additional care and questions about your experience.      You have received additional information specific to your diagnosis in these discharge instructions, please read these fully.  Anytime you have been seen in the emergency department we recommend close follow up with your primary care provider or specialist, please follow these directions as indicated.      XRAY images in the emergency department are evaluated by the Emergency Physician at night, a radiologist with specific training will review your image the next day.  You may receive a phone call by the emergency department with any  inconsistencies that are noted by the radiologist and further recommendations, please answer any unknown calls in order for us to reach you in a timely manner.    Continue taking the azithromycin and the prednisone.  I recommend taking the prednisone only 20 mg once a day.

## 2024-10-02 NOTE — Clinical Note
[facility] EMERGENCY DEPARTMENT  [address] [address]  Phone: [phone]    Alok Bedoya was seen and treated in our emergency department on 10/2/2024.  He may return to work on 10/02/2024.         Thank you for choosing [facility].    Ean Sullivan, DO

## 2024-10-02 NOTE — ED PROVIDER NOTES
[facility] EMERGENCY DEPARTMENT  Emergency Department Encounter  Emergency Medicine Physician Note     Pt Name:Alok Bedoya  MRN: [id number]  Birthdate 1990  Date of evaluation: 10/2/2024  PCP:  Maryann Oconnell APRN  Note Started: 3:05 PM EDT      CHIEF COMPLAINT       Chief Complaint   Patient presents with    Shortness of Breath       HISTORY OF PRESENT ILLNESS  (Location/Symptom, Timing/Onset, Context/Setting, Quality, Duration, Modifying Factors, Severity.)      Alok Bedoya is a 33 y.o. male who presents with shortness of breath, cough that has been going on for multiple months.  Patient does describe it is acutely worsened over the last couple weeks.  Patient reports a remote history of chest wall trauma with multiple rib fractures, states that he is having lung problems since.  Patient has not followed with a pulmonologist.  Patient was evaluated urgent care yesterday and was given steroids and azithromycin, patient had worsening shortness of breath this morning with no improvement after initiation of these management.    PAST MEDICAL / SURGICAL / SOCIAL / FAMILY HISTORY     Past Medical History:   Diagnosis Date    Murmur, cardiac     REPORTS THAT ASYMPTOMATIC AND HASNT SEEN CARDIOLOGIST  FOR YEARS     Tear of left glenoid labrum      No additional pertinent       Past Surgical History:   Procedure Laterality Date    HAND SURGERY Left     THUMB HAD TO BE REATTACHED POST ACCIDENT AT WORK    SHOULDER ARTHROSCOPY W/ LABRAL REPAIR Left 3/8/2022    Procedure: LEFT SHOULDER ARTHROSCOPIC LABRAL REPAIR WITH EXCISION PARALABRAL CYST, SUBCROMIAL DECOMPRESSION,DISTAL CLAVICLE EXCISION;  Surgeon: Chapin Caballero MD;  Location: formerly [facility] [place];  Service: Orthopedics;  Laterality: Left;     No additional pertinent       Social History     Socioeconomic History    Marital status: Single   Tobacco Use    Smoking status: Former     Types: Cigarettes    Smokeless tobacco: Never    Tobacco  "comments:     OVER A YEAR AGO   Vaping Use    Vaping status: Some Days    Substances: Nicotine    Devices: Pre-filled pod   Substance and Sexual Activity    Alcohol use: Not Currently    Drug use: Never    Sexual activity: Defer       History reviewed. No pertinent family history.    Allergies:  Morphine    Home Medications:  Prior to Admission medications    Medication Sig Start Date End Date Taking? Authorizing Provider   Aspirin Low Dose 81 MG chewable tablet  6/24/22   Aren Bishop MD   bacitracin 500 UNIT/GM ointment  6/24/22   Aren Bishop MD   ferrous sulfate 325 (65 FE) MG EC tablet  6/24/22   Aren Bishop MD   folic acid (FOLVITE) 1 MG tablet  6/24/22   Aren Bishop MD   gabapentin (NEURONTIN) 300 MG capsule  6/24/22   Aren Bishop MD   ibuprofen (ADVIL,MOTRIN) 200 MG tablet Take 800 mg by mouth Daily.    Aren Bishop MD   Lidoderm 5 %  6/24/22   Aren Bishop MD   methocarbamol (ROBAXIN) 500 MG tablet  6/24/22   Aren Bishop MD   naloxone (NARCAN) 4 MG/0.1ML nasal spray  6/24/22   Aren Bishop MD   Prenatal Vit-Fe Fumarate-FA (Trinatal Rx 1) 60-1 MG tablet Take 1 tablet by mouth Daily. 6/24/22   Aren Bishop MD   thiamine 100 MG tablet  6/24/22   Aren Bishop MD         REVIEW OF SYSTEMS       Review of Systems   Constitutional:  Negative for chills and fever.   Respiratory:  Positive for cough, chest tightness and shortness of breath.    Cardiovascular:  Negative for chest pain.   Gastrointestinal:  Negative for abdominal pain, diarrhea, nausea and vomiting.   Genitourinary:  Negative for flank pain.   Neurological:  Negative for dizziness and light-headedness.       PHYSICAL EXAM      INITIAL VITALS:   /84 (BP Location: Left arm, Patient Position: Sitting)   Pulse 96   Temp 98.2 °F (36.8 °C) (Oral)   Resp 18   Ht 185.4 cm (73\")   Wt 78.9 kg (174 lb)   SpO2 94%   BMI 22.96 kg/m²     Physical " Exam  Constitutional:       Appearance: Normal appearance.   HENT:      Head: Normocephalic and atraumatic.   Eyes:      Extraocular Movements: Extraocular movements intact.   Cardiovascular:      Rate and Rhythm: Normal rate and regular rhythm.   Pulmonary:      Effort: Pulmonary effort is normal.      Breath sounds: Examination of the left-lower field reveals rhonchi. Rhonchi present.   Abdominal:      General: Abdomen is flat.      Palpations: Abdomen is soft.      Tenderness: There is no abdominal tenderness.   Musculoskeletal:      Right lower leg: No edema.      Left lower leg: No edema.   Skin:     General: Skin is warm and dry.   Neurological:      General: No focal deficit present.      Mental Status: He is alert and oriented to person, place, and time.   Psychiatric:         Mood and Affect: Mood normal.         Behavior: Behavior normal.           DDX/DIAGNOSTIC RESULTS / EMERGENCY DEPARTMENT COURSE / MDM     Differential Diagnosis included but not limited: Pneumonia, bronchitis, allergic bronchitis, viral illness, chronic interstitial lung disease    Diagnoses Considered but Do Not Suspect: Traumatic hemopneumothorax,    Decision Rules/Scores utilized: N/A     Tests considered but not ordered and why:  N/A    MIPS: N/A     Code Status Discussion:  Not Discussed    Additional Patient Education Provided: None     Medical Decision Making    Medical Decision Making  Patient presenting with shortness of breath, dyspnea has been going on for 10 days to 2 weeks.  With prolonged symptoms do feel this could be bacterial in nature.  Patient with stable vitals.  X-ray demonstrated concerns of bronchitis, this may be viral but do feel patient needs to be treated with antibiotics.  Patient already on azithromycin, do feel amoxicillin is more appropriate antibiotic for treatment at this time.  Patient also given steroids at outside facility, encourage patient to continue the steroid management.  Patient provided  pulmonology as he has had chronic lung issues since having traumatic injury of the left lung previously.  Do feel patient would benefit from following up with pulmonology.  Patient stable for discharge with stable vitals, stable x-ray, patient struck to return for any worsening shortness of breath or any other complications.  Patient agreeable plan.    Problems Addressed:  Bronchitis: complicated acute illness or injury  Chronic lung disease: complicated acute illness or injury    Amount and/or Complexity of Data Reviewed  Labs: ordered.  Radiology: ordered.  ECG/medicine tests: ordered.    Risk  OTC drugs.  Prescription drug management.        See ED COURSE for additional MDM statements    EKG  None Performed     All EKG's are interpreted by the Emergency Department Physician who either signs or Co-signs this chart in the absence of a cardiologist.    Additional Scores                   EMERGENCY DEPARTMENT COURSE:         PROCEDURES:  None Performed   Procedures    DATA FOR LAB AND RADIOLOGY TESTS ORDERED BELOW ARE REVIEWED BY THE ED CLINICIAN:    RADIOLOGY: All x-rays, CT, MRI, and formal ultrasound images (except ED bedside ultrasound) are read by the radiologist, see reports below, unless otherwise noted in MDM or here.  Reports below are reviewed by myself.  XR Chest 2 View   Final Result   Bronchitis without pneumonia..                                           This report was signed and finalized on 10/2/2024 2:45 PM by Catie Mi MD.              LABS: Lab orders shown below, the results are reviewed by myself, and all abnormals are listed below.  Labs Reviewed   COMPREHENSIVE METABOLIC PANEL - Abnormal; Notable for the following components:       Result Value    Glucose 124 (*)     AST (SGOT) 69 (*)     All other components within normal limits    Narrative:     GFR Normal >60  Chronic Kidney Disease <60  Kidney Failure <15     CBC WITH AUTO DIFFERENTIAL - Abnormal; Notable for the following  components:    WBC 14.05 (*)     Neutrophil % 81.9 (*)     Lymphocyte % 8.6 (*)     Neutrophils, Absolute 11.51 (*)     Monocytes, Absolute 1.14 (*)     All other components within normal limits   COVID-19 AND FLU A/B, NP SWAB IN TRANSPORT MEDIA 1 HR TAT - Normal    Narrative:     Fact sheet for providers: https://www.fda.gov/media/567399/download    Fact sheet for patients: https://www.fda.gov/media/985670/download    Test performed by PCR.   BNP (IN-HOUSE) - Normal    Narrative:     This assay is used as an aid in the diagnosis of individuals suspected of having heart failure. It can be used as an aid in the diagnosis of acute decompensated heart failure (ADHF) in patients presenting with signs and symptoms of ADHF to the emergency department (ED). In addition, NT-proBNP of <300 pg/mL indicates ADHF is not likely.    Age Range Result Interpretation  NT-proBNP Concentration (pg/mL:      <50             Positive            >450                   Gray                 300-450                    Negative             <300    50-75           Positive            >900                  Gray                300-900                  Negative            <300      >75             Positive            >1800                  Gray                300-1800                  Negative            <300   SINGLE HS TROPONIN T - Normal    Narrative:     High Sensitive Troponin T Reference Range:  <14.0 ng/L- Negative Female for AMI  <22.0 ng/L- Negative Male for AMI  >=14 - Abnormal Female indicating possible myocardial injury.  >=22 - Abnormal Male indicating possible myocardial injury.   Clinicians would have to utilize clinical acumen, EKG, Troponin, and serial changes to determine if it is an Acute Myocardial Infarction or myocardial injury due to an underlying chronic condition.        RAINBOW DRAW    Narrative:     The following orders were created for panel order [place] Draw.  Procedure                               Abnormality        "  Status                     ---------                               -----------         ------                     Green Top (Gel)[[id number]]                                  Final result               Lavender Top[[id number]]                                     Final result               Gold Top - SST[[id number]]                                   Final result               Light Blue Top[[id number]]                                   Final result                 Please view results for these tests on the individual orders.   CBC AND DIFFERENTIAL    Narrative:     The following orders were created for panel order CBC & Differential.  Procedure                               Abnormality         Status                     ---------                               -----------         ------                     CBC Auto Differential[[id number]]        Abnormal            Final result                 Please view results for these tests on the individual orders.   GREEN TOP   LAVENDER TOP   GOLD TOP - SST   LIGHT BLUE TOP       Vitals Reviewed:    Vitals:    10/02/24 1332   BP: 130/84   BP Location: Left arm   Patient Position: Sitting   Pulse: 96   Resp: 18   Temp: 98.2 °F (36.8 °C)   TempSrc: Oral   SpO2: 94%   Weight: 78.9 kg (174 lb)   Height: 185.4 cm (73\")       MEDICATIONS GIVEN TO PATIENT THIS ENCOUNTER:  Medications - No data to display      CONSULTS:  None    CRITICAL CARE:  There was significant risk of life threatening deterioration of patient's condition requiring my direct management. Critical care time 0 minutes, excluding any documented procedures.    FINAL IMPRESSION      1. Bronchitis    2. Chronic lung disease          DISPOSITION / PLAN     ED Disposition       ED Disposition   Discharge    Condition   Stable    Comment   --               PATIENT REFERRED TO:  [facility] PULMONARY CRITICAL CARE & SLEEP MEDICINE  [address]  [facility] " [id number] [phone]  In 2 weeks      Maryann Oconnell, APRN  [address] [place] [address]  [phone]    In 1 week        DISCHARGE MEDICATIONS:     Medication List        START taking these medications      amoxicillin 500 MG capsule  Commonly known as: AMOXIL  Take 2 capsules by mouth 2 (Two) Times a Day for 7 days.     cetirizine 10 MG tablet  Commonly known as: zyrTEC  Take 1 tablet by mouth Daily for 30 days.            CHANGE how you take these medications      * Ventolin  (90 Base) MCG/ACT inhaler  Generic drug: albuterol sulfate HFA  What changed: Another medication with the same name was added. Make sure you understand how and when to take each.     * albuterol (2.5 MG/3ML) 0.083% nebulizer solution  Commonly known as: PROVENTIL  Take 2.5 mg by nebulization Every 4 (Four) Hours As Needed for Wheezing.  What changed: You were already taking a medication with the same name, and this prescription was added. Make sure you understand how and when to take each.           * This list has 2 medication(s) that are the same as other medications prescribed for you. Read the directions carefully, and ask your doctor or other care provider to review them with you.                CONTINUE taking these medications      Aspirin Low Dose 81 MG chewable tablet  Generic drug: aspirin     bacitracin 500 UNIT/GM ointment     busPIRone 10 MG tablet  Commonly known as: BUSPAR     escitalopram 20 MG tablet  Commonly known as: LEXAPRO     ferrous sulfate 325 (65 FE) MG EC tablet     folic acid 1 MG tablet  Commonly known as: FOLVITE     gabapentin 300 MG capsule  Commonly known as: NEURONTIN     ibuprofen 200 MG tablet  Commonly known as: ADVIL,MOTRIN     Lidoderm 5 %  Generic drug: lidocaine     methocarbamol 500 MG tablet  Commonly known as: ROBAXIN     naloxone 4 MG/0.1ML nasal spray  Commonly known as: NARCAN     thiamine 100 MG tablet  Commonly known as: VITAMIN B1     Trinatal Rx 1 60-1 MG  tablet               Where to Get Your Medications        These medications were sent to [facility]/pharmacy #[id number] - [place], [place] - [address] [place] [address] - [phone]  - [phone]   [address] [address]      Phone: [phone]   albuterol (2.5 MG/3ML) 0.083% nebulizer solution  amoxicillin 500 MG capsule  cetirizine 10 MG tablet         Electronically signed by Ean Sullivan DO, 10/02/24, 3:05 PM EDT.    Emergency Medicine Physician  [facility]  (Please note that portions of thisnote were completed with a voice recognition program.  Efforts were made to edit the dictations but occasionally words are mis-transcribed.)       Ean Sullivan DO  10/03/24 1500

## 2024-10-03 ENCOUNTER — APPOINTMENT (OUTPATIENT)
Dept: CT IMAGING | Facility: HOSPITAL | Age: 34
End: 2024-10-03
Payer: MEDICAID

## 2024-10-03 ENCOUNTER — APPOINTMENT (OUTPATIENT)
Dept: GENERAL RADIOLOGY | Facility: HOSPITAL | Age: 34
End: 2024-10-03
Payer: MEDICAID

## 2024-10-03 ENCOUNTER — HOSPITAL ENCOUNTER (EMERGENCY)
Facility: HOSPITAL | Age: 34
Discharge: HOME | End: 2024-10-03
Attending: STUDENT IN AN ORGANIZED HEALTH CARE EDUCATION/TRAINING PROGRAM
Payer: MEDICAID

## 2024-10-03 VITALS
RESPIRATION RATE: 16 BRPM | OXYGEN SATURATION: 93 % | SYSTOLIC BLOOD PRESSURE: 135 MMHG | DIASTOLIC BLOOD PRESSURE: 85 MMHG | BODY MASS INDEX: 22.93 KG/M2 | TEMPERATURE: 97.7 F | HEART RATE: 100 BPM | WEIGHT: 173 LBS | HEIGHT: 73 IN

## 2024-10-03 DIAGNOSIS — J40 BRONCHITIS: Primary | ICD-10-CM

## 2024-10-03 LAB
ALBUMIN SERPL-MCNC: 4.7 G/DL (ref 3.5–5.2)
ALBUMIN/GLOB SERPL: 1.9 G/DL
ALP SERPL-CCNC: 57 U/L (ref 39–117)
ALT SERPL W P-5'-P-CCNC: 57 U/L (ref 1–41)
ANION GAP SERPL CALCULATED.3IONS-SCNC: 12 MMOL/L (ref 5–15)
AST SERPL-CCNC: 122 U/L (ref 1–40)
BASOPHILS # BLD AUTO: 0.11 10*3/MM3 (ref 0–0.2)
BASOPHILS NFR BLD AUTO: 1.2 % (ref 0–1.5)
BILIRUB SERPL-MCNC: 0.6 MG/DL (ref 0–1.2)
BUN SERPL-MCNC: 10 MG/DL (ref 6–20)
BUN/CREAT SERPL: 9 (ref 7–25)
CALCIUM SPEC-SCNC: 9.6 MG/DL (ref 8.6–10.5)
CHLORIDE SERPL-SCNC: 104 MMOL/L (ref 98–107)
CO2 SERPL-SCNC: 25 MMOL/L (ref 22–29)
CREAT SERPL-MCNC: 1.11 MG/DL (ref 0.76–1.27)
DEPRECATED RDW RBC AUTO: 48.1 FL (ref 37–54)
EGFRCR SERPLBLD CKD-EPI 2021: 89.9 ML/MIN/1.73
EOSINOPHIL # BLD AUTO: 1.74 10*3/MM3 (ref 0–0.4)
EOSINOPHIL NFR BLD AUTO: 18.3 % (ref 0.3–6.2)
ERYTHROCYTE [DISTWIDTH] IN BLOOD BY AUTOMATED COUNT: 13.9 % (ref 12.3–15.4)
GLOBULIN UR ELPH-MCNC: 2.5 GM/DL
GLUCOSE SERPL-MCNC: 133 MG/DL (ref 65–99)
HCT VFR BLD AUTO: 42.3 % (ref 37.5–51)
HGB BLD-MCNC: 14.5 G/DL (ref 13–17.7)
HOLD SPECIMEN: NORMAL
HOLD SPECIMEN: NORMAL
IMM GRANULOCYTES # BLD AUTO: 0.02 10*3/MM3 (ref 0–0.05)
IMM GRANULOCYTES NFR BLD AUTO: 0.2 % (ref 0–0.5)
LYMPHOCYTES # BLD AUTO: 1.93 10*3/MM3 (ref 0.7–3.1)
LYMPHOCYTES NFR BLD AUTO: 20.3 % (ref 19.6–45.3)
MCH RBC QN AUTO: 32.2 PG (ref 26.6–33)
MCHC RBC AUTO-ENTMCNC: 34.3 G/DL (ref 31.5–35.7)
MCV RBC AUTO: 94 FL (ref 79–97)
MONOCYTES # BLD AUTO: 0.73 10*3/MM3 (ref 0.1–0.9)
MONOCYTES NFR BLD AUTO: 7.7 % (ref 5–12)
NEUTROPHILS NFR BLD AUTO: 4.97 10*3/MM3 (ref 1.7–7)
NEUTROPHILS NFR BLD AUTO: 52.3 % (ref 42.7–76)
NRBC BLD AUTO-RTO: 0 /100 WBC (ref 0–0.2)
NT-PROBNP SERPL-MCNC: <36 PG/ML (ref 0–450)
PLATELET # BLD AUTO: 324 10*3/MM3 (ref 140–450)
PMV BLD AUTO: 10.1 FL (ref 6–12)
POTASSIUM SERPL-SCNC: 3.4 MMOL/L (ref 3.5–5.2)
PROT SERPL-MCNC: 7.2 G/DL (ref 6–8.5)
RBC # BLD AUTO: 4.5 10*6/MM3 (ref 4.14–5.8)
SODIUM SERPL-SCNC: 141 MMOL/L (ref 136–145)
TROPONIN T SERPL HS-MCNC: 7 NG/L
WBC NRBC COR # BLD AUTO: 9.5 10*3/MM3 (ref 3.4–10.8)
WHOLE BLOOD HOLD COAG: NORMAL
WHOLE BLOOD HOLD SPECIMEN: NORMAL

## 2024-10-03 PROCEDURE — 83880 ASSAY OF NATRIURETIC PEPTIDE: CPT | Performed by: STUDENT IN AN ORGANIZED HEALTH CARE EDUCATION/TRAINING PROGRAM

## 2024-10-03 PROCEDURE — 84484 ASSAY OF TROPONIN QUANT: CPT | Performed by: STUDENT IN AN ORGANIZED HEALTH CARE EDUCATION/TRAINING PROGRAM

## 2024-10-03 PROCEDURE — 93005 ELECTROCARDIOGRAM TRACING: CPT | Performed by: STUDENT IN AN ORGANIZED HEALTH CARE EDUCATION/TRAINING PROGRAM

## 2024-10-03 PROCEDURE — 99285 EMERGENCY DEPT VISIT HI MDM: CPT

## 2024-10-03 PROCEDURE — 85025 COMPLETE CBC W/AUTO DIFF WBC: CPT | Performed by: STUDENT IN AN ORGANIZED HEALTH CARE EDUCATION/TRAINING PROGRAM

## 2024-10-03 PROCEDURE — 71046 X-RAY EXAM CHEST 2 VIEWS: CPT

## 2024-10-03 PROCEDURE — 25510000001 IOPAMIDOL 61 % SOLUTION: Performed by: STUDENT IN AN ORGANIZED HEALTH CARE EDUCATION/TRAINING PROGRAM

## 2024-10-03 PROCEDURE — 71275 CT ANGIOGRAPHY CHEST: CPT

## 2024-10-03 PROCEDURE — 80053 COMPREHEN METABOLIC PANEL: CPT | Performed by: STUDENT IN AN ORGANIZED HEALTH CARE EDUCATION/TRAINING PROGRAM

## 2024-10-03 RX ORDER — SODIUM CHLORIDE 0.9 % (FLUSH) 0.9 %
10 SYRINGE (ML) INJECTION AS NEEDED
Status: DISCONTINUED | OUTPATIENT
Start: 2024-10-03 | End: 2024-10-03 | Stop reason: HOSPADM

## 2024-10-03 RX ORDER — IOPAMIDOL 612 MG/ML
100 INJECTION, SOLUTION INTRAVASCULAR
Status: COMPLETED | OUTPATIENT
Start: 2024-10-03 | End: 2024-10-03

## 2024-10-03 RX ORDER — ALBUTEROL SULFATE 0.83 MG/ML
10 SOLUTION RESPIRATORY (INHALATION) ONCE
Status: COMPLETED | OUTPATIENT
Start: 2024-10-03 | End: 2024-10-03

## 2024-10-03 RX ORDER — POTASSIUM CHLORIDE 750 MG/1
20 CAPSULE, EXTENDED RELEASE ORAL ONCE
Status: COMPLETED | OUTPATIENT
Start: 2024-10-03 | End: 2024-10-03

## 2024-10-03 RX ADMIN — IOPAMIDOL 100 ML: 612 INJECTION, SOLUTION INTRAVENOUS at 11:28

## 2024-10-03 RX ADMIN — POTASSIUM CHLORIDE 20 MEQ: 750 CAPSULE, EXTENDED RELEASE ORAL at 11:08

## 2024-10-03 RX ADMIN — IPRATROPIUM BROMIDE 0.5 MG: 0.5 SOLUTION RESPIRATORY (INHALATION) at 11:09

## 2024-10-03 RX ADMIN — ALBUTEROL SULFATE 10 MG: 2.5 SOLUTION RESPIRATORY (INHALATION) at 11:09

## 2024-10-03 NOTE — ED PROVIDER NOTES
Subjective:  History of Present Illness:    Patient is a 33-year-old male without contributing health history.  Presents to the ER today for shortness of air and cough x 3 days.  Patient reports that he was seen here at the ER yesterday.  Reports he is also been seen in urgent care and was given steroid per IM injection and home steroid.  Patient's wife reports 1 episode of hypoxia last night SpO2 86%.  Patient denies being vaccinated reports wheezing.  Is alert and oriented during this assessment.  Denies OTC medication or home remedy.  Denies alleviating or exacerbating factors.    Nurses Notes reviewed and agree, including vitals, allergies, social history and prior medical history.     REVIEW OF SYSTEMS: All systems reviewed and not pertinent unless noted.  Review of Systems   Respiratory:  Positive for shortness of breath.    All other systems reviewed and are negative.      Past Medical History:   Diagnosis Date    Murmur, cardiac     REPORTS THAT ASYMPTOMATIC AND HASNT SEEN CARDIOLOGIST  FOR YEARS     Tear of left glenoid labrum        Allergies:    Morphine      Past Surgical History:   Procedure Laterality Date    HAND SURGERY Left     THUMB HAD TO BE REATTACHED POST ACCIDENT AT WORK    SHOULDER ARTHROSCOPY W/ LABRAL REPAIR Left 3/8/2022    Procedure: LEFT SHOULDER ARTHROSCOPIC LABRAL REPAIR WITH EXCISION PARALABRAL CYST, SUBCROMIAL DECOMPRESSION,DISTAL CLAVICLE EXCISION;  Surgeon: Chapin Caballero MD;  [place] [facility] [place] [facility] [place]  Service: Orthopedics;  Laterality: Left;         Social History     Socioeconomic History    Marital status: Single   Tobacco Use    Smoking status: Former     Types: Cigarettes    Smokeless tobacco: Never    Tobacco comments:     OVER A YEAR AGO   Vaping Use    Vaping status: Some Days    Substances: Nicotine    Devices: Pre-filled pod   Substance and Sexual Activity    Alcohol use: Not Currently    Drug use: Never    Sexual activity: Defer         History reviewed. No pertinent  "family history.    Objective  Physical Exam:  /85   Pulse 100   Temp 97.7 °F (36.5 °C)   Resp 16   Ht 185.4 cm (73\")   Wt 78.5 kg (173 lb)   SpO2 93%   BMI 22.82 kg/m²      Physical Exam  Vitals and nursing note reviewed.   Constitutional:       Appearance: He is well-developed and normal weight.   HENT:      Head: Normocephalic and atraumatic.      Mouth/Throat:      Mouth: Mucous membranes are moist.      Pharynx: Oropharynx is clear.   Eyes:      Extraocular Movements: Extraocular movements intact.      Pupils: Pupils are equal, round, and reactive to light.   Cardiovascular:      Rate and Rhythm: Normal rate and regular rhythm.   Pulmonary:      Effort: Pulmonary effort is normal.      Breath sounds: Normal breath sounds.   Musculoskeletal:         General: Normal range of motion.      Cervical back: Normal range of motion and neck supple.   Skin:     General: Skin is warm and dry.      Capillary Refill: Capillary refill takes less than 2 seconds.   Neurological:      General: No focal deficit present.      Mental Status: He is alert and oriented to person, place, and time.   Psychiatric:         Mood and Affect: Mood normal.         Behavior: Behavior normal.         Procedures    ED Course:         Lab Results (last 24 hours)       Procedure Component Value Units Date/Time    COVID-19 and FLU A/B PCR, 1 HR TAT - Swab, Nasopharynx [[id number]]  (Normal) Collected: 10/02/24 1509    Specimen: Swab from Nasopharynx Updated: 10/02/24 1541     COVID19 Not Detected     Influenza A PCR Not Detected     Influenza B PCR Not Detected    Narrative:      Fact sheet for providers: https://www.fda.gov/media/797456/download    Fact sheet for patients: https://www.fda.gov/media/636714/download    Test performed by PCR.    CBC & Differential [[id number]]  (Abnormal) Collected: 10/03/24 1009    Specimen: Blood Updated: 10/03/24 1016    Narrative:      The following orders were created for panel order CBC & " Differential.  Procedure                               Abnormality         Status                     ---------                               -----------         ------                     CBC Auto Differential[[id number]]        Abnormal            Final result                 Please view results for these tests on the individual orders.    Comprehensive Metabolic Panel [[id number]]  (Abnormal) Collected: 10/03/24 1009    Specimen: Blood Updated: 10/03/24 1039     Glucose 133 mg/dL      BUN 10 mg/dL      Creatinine 1.11 mg/dL      Sodium 141 mmol/L      Potassium 3.4 mmol/L      Chloride 104 mmol/L      CO2 25.0 mmol/L      Calcium 9.6 mg/dL      Total Protein 7.2 g/dL      Albumin 4.7 g/dL      ALT (SGPT) 57 U/L      AST (SGOT) 122 U/L      Alkaline Phosphatase 57 U/L      Total Bilirubin 0.6 mg/dL      Globulin 2.5 gm/dL      A/G Ratio 1.9 g/dL      BUN/Creatinine Ratio 9.0     Anion Gap 12.0 mmol/L      eGFR 89.9 mL/min/1.73     Narrative:      GFR Normal >60  Chronic Kidney Disease <60  Kidney Failure <15      BNP [[id number]]  (Normal) Collected: 10/03/24 1009    Specimen: Blood Updated: 10/03/24 1045     proBNP <36.0 pg/mL     Narrative:      This assay is used as an aid in the diagnosis of individuals suspected of having heart failure. It can be used as an aid in the diagnosis of acute decompensated heart failure (ADHF) in patients presenting with signs and symptoms of ADHF to the emergency department (ED). In addition, NT-proBNP of <300 pg/mL indicates ADHF is not likely.    Age Range Result Interpretation  NT-proBNP Concentration (pg/mL:      <50             Positive            >450                   Gray                 300-450                    Negative             <300    50-75           Positive            >900                  Gray                300-900                  Negative            <300      >75             Positive            >1800                  Gray                300-1800                   Negative            <300    Single High Sensitivity Troponin T [[id number]]  (Normal) Collected: 10/03/24 1009    Specimen: Blood Updated: 10/03/24 1042     HS Troponin T 7 ng/L     Narrative:      High Sensitive Troponin T Reference Range:  <14.0 ng/L- Negative Female for AMI  <22.0 ng/L- Negative Male for AMI  >=14 - Abnormal Female indicating possible myocardial injury.  >=22 - Abnormal Male indicating possible myocardial injury.   Clinicians would have to utilize clinical acumen, EKG, Troponin, and serial changes to determine if it is an Acute Myocardial Infarction or myocardial injury due to an underlying chronic condition.         CBC Auto Differential [[id number]]  (Abnormal) Collected: 10/03/24 1009    Specimen: Blood Updated: 10/03/24 1016     WBC 9.50 10*3/mm3      RBC 4.50 10*6/mm3      Hemoglobin 14.5 g/dL      Hematocrit 42.3 %      MCV 94.0 fL      MCH 32.2 pg      MCHC 34.3 g/dL      RDW 13.9 %      RDW-SD 48.1 fl      MPV 10.1 fL      Platelets 324 10*3/mm3      Neutrophil % 52.3 %      Lymphocyte % 20.3 %      Monocyte % 7.7 %      Eosinophil % 18.3 %      Basophil % 1.2 %      Immature Grans % 0.2 %      Neutrophils, Absolute 4.97 10*3/mm3      Lymphocytes, Absolute 1.93 10*3/mm3      Monocytes, Absolute 0.73 10*3/mm3      Eosinophils, Absolute 1.74 10*3/mm3      Basophils, Absolute 0.11 10*3/mm3      Immature Grans, Absolute 0.02 10*3/mm3      nRBC 0.0 /100 WBC              CT Angiogram Chest Pulmonary Embolism    Result Date: 10/3/2024  PROCEDURE: CT ANGIOGRAM CHEST PULMONARY EMBOLISM-  HISTORY: Shortness of breath  COMPARISON: None.  TECHNIQUE: The patient was injected with  IV contrast. Axial images were obtained through the chest in a CTA/ PE protocol. 3 D Reconstruction images were also performed. This study was performed with techniques to keep radiation doses as low as reasonably achievable, (ALARA). Individualized dose reduction techniques using automated exposure control or adjustment of mA  and/or kV according to the patient size were employed.  FINDINGS: There is no axillary adenopathy. There is no hilar or mediastinal adenopathy.  The heart is proper size. There is no pericardial or pleural effusion. No filling defects are identified to suggest PE. There is no evidence of thoracic aortic aneurysm or dissection. Limited images of the upper abdomen are unremarkable. No suspicious infiltrate or nodule is identified. There is evidence of old calcified granulomatous disease. Mild emphysematous change noted.      Impression: No evidence of pulmonary embolism.  Mild emphysematous change.   CTDI: 3.08 mGy DLP:129.54 mGy.cm  This report was signed and finalized on 10/3/2024 12:18 PM by Catie Mi MD.      XR Chest 2 View    Result Date: 10/3/2024  PROCEDURE: XR CHEST 2 VW-  HISTORY: [facility] Triage Protocol, acute, increased shortness of breath and cough compared to yesterday.  COMPARISON: 1 day prior.  FINDINGS: The heart is normal in size. The lungs are clear. The mediastinum is unremarkable. There is no pneumothorax.  There are no acute osseous abnormalities. Hyperinflation noted. Bronchial wall thickening noted.      Impression: Bronchitis and hyperinflation without pneumonia .   This report was signed and finalized on 10/3/2024 11:11 AM by Catie Mi MD.      XR Chest 2 View    Result Date: 10/2/2024  PROCEDURE: XR CHEST 2 VW-  HISTORY: [facility] Triage Protocol, history of COPD, feels tight when breathing.  COMPARISON: None.  FINDINGS: The heart is normal in size. The lungs are clear. The mediastinum is unremarkable. There is no pneumothorax.  There are no acute osseous abnormalities. Bronchial wall thickening consistent with bronchitis noted.      Impression: Bronchitis without pneumonia..           This report was signed and finalized on 10/2/2024 2:45 PM by Catie Mi MD.          MDM     Amount and/or Complexity of Data Reviewed  Decide to obtain previous medical records or to obtain history from someone  other than the patient: yes        Initial impression of presenting illness: Patient is a 33-year-old male without contributing health history.  Presents to the ER today for shortness of air and cough x 3 days.  Patient reports that he was seen here at the ER yesterday.  Reports he is also been seen in urgent care and was given steroid per IM injection and home steroid.  Patient's wife reports 1 episode of hypoxia last night SpO2 86%.  Patient denies being vaccinated reports wheezing.  Is alert and oriented during this assessment.  Denies OTC medication or home remedy.  Denies alleviating or exacerbating factors.    DDX: includes but is not limited to: Pneumonia, COPD, emphysema, viral infection or other    Patient arrives stable with vitals interpreted by myself.     Pertinent features from physical exam: Wheezes are heard in bilateral upper lobe.  Middle and lower lobe are clear.  Abdomen soft nontender.  Bowel sounds are normal.  Heart sounds are normal..    Initial diagnostic plan: CBC, CMP, troponin, BNP, EKG, CT PE protocol, chest x-ray    Results from initial plan were reviewed and interpreted by me revealing CBC is within normal parameters.  CMP is a mildly low potassium and mild elevation in liver enzyme.  Otherwise appropriate range.  BNP and troponin were both negative.  EKG sinus rhythm rate of 87 bpm.  Chest x-ray with the following pression bronchitis and hyperinflation without pneumonia CT PE protocol with the following impression.  No evidence of pulmonary emboli, mild emphysematous change    Diagnostic information from other sources: Chart review    Interventions / Re-evaluation: Vital signs stable throughout encounter.  Patient had continuous neb while here in the ER.  Reports that he feels much better.    Results/clinical rationale were discussed with patient    Consultations/Discussion of results with other physicians: N/A    Disposition plan: Patient is hemodynamically stable nontoxic-appearing  appropriate discharge.  Will have patient follow-up with PCP in 1 week.  Follow with ER for new or symptoms.  -----        Final diagnoses:   Bronchitis          Jovany Miller, APRN  10/03/24 1440

## 2025-02-17 ENCOUNTER — APPOINTMENT (OUTPATIENT)
Dept: CT IMAGING | Facility: HOSPITAL | Age: 35
End: 2025-02-17
Payer: MEDICAID

## 2025-02-17 ENCOUNTER — HOSPITAL ENCOUNTER (EMERGENCY)
Facility: HOSPITAL | Age: 35
Discharge: HOME | End: 2025-02-17
Attending: EMERGENCY MEDICINE | Admitting: EMERGENCY MEDICINE
Payer: MEDICAID

## 2025-02-17 VITALS
HEART RATE: 85 BPM | DIASTOLIC BLOOD PRESSURE: 84 MMHG | TEMPERATURE: 98.2 F | SYSTOLIC BLOOD PRESSURE: 124 MMHG | BODY MASS INDEX: 22.46 KG/M2 | RESPIRATION RATE: 18 BRPM | WEIGHT: 175 LBS | OXYGEN SATURATION: 95 % | HEIGHT: 74 IN

## 2025-02-17 DIAGNOSIS — R19.7 NAUSEA VOMITING AND DIARRHEA: Primary | ICD-10-CM

## 2025-02-17 DIAGNOSIS — R11.2 NAUSEA VOMITING AND DIARRHEA: Primary | ICD-10-CM

## 2025-02-17 LAB
ALBUMIN SERPL-MCNC: 4.6 G/DL (ref 3.5–5.2)
ALBUMIN/GLOB SERPL: 1.7 G/DL
ALP SERPL-CCNC: 66 U/L (ref 39–117)
ALT SERPL W P-5'-P-CCNC: 23 U/L (ref 1–41)
ANION GAP SERPL CALCULATED.3IONS-SCNC: 11.9 MMOL/L (ref 5–15)
AST SERPL-CCNC: 26 U/L (ref 1–40)
BACTERIA UR QL AUTO: ABNORMAL /HPF
BASOPHILS # BLD AUTO: 0.02 10*3/MM3 (ref 0–0.2)
BASOPHILS NFR BLD AUTO: 0.3 % (ref 0–1.5)
BILIRUB SERPL-MCNC: 0.4 MG/DL (ref 0–1.2)
BILIRUB UR QL STRIP: NEGATIVE
BUN SERPL-MCNC: 12 MG/DL (ref 6–20)
BUN/CREAT SERPL: 9 (ref 7–25)
CALCIUM SPEC-SCNC: 9.4 MG/DL (ref 8.6–10.5)
CHLORIDE SERPL-SCNC: 98 MMOL/L (ref 98–107)
CLARITY UR: CLEAR
CO2 SERPL-SCNC: 24.1 MMOL/L (ref 22–29)
COLOR UR: YELLOW
CREAT SERPL-MCNC: 1.34 MG/DL (ref 0.76–1.27)
DEPRECATED RDW RBC AUTO: 45 FL (ref 37–54)
EGFRCR SERPLBLD CKD-EPI 2021: 71.3 ML/MIN/1.73
EOSINOPHIL # BLD AUTO: 0.14 10*3/MM3 (ref 0–0.4)
EOSINOPHIL NFR BLD AUTO: 2 % (ref 0.3–6.2)
ERYTHROCYTE [DISTWIDTH] IN BLOOD BY AUTOMATED COUNT: 13.2 % (ref 12.3–15.4)
FLUAV RNA RESP QL NAA+PROBE: NOT DETECTED
FLUBV RNA RESP QL NAA+PROBE: NOT DETECTED
GLOBULIN UR ELPH-MCNC: 2.7 GM/DL
GLUCOSE SERPL-MCNC: 112 MG/DL (ref 65–99)
GLUCOSE UR STRIP-MCNC: NEGATIVE MG/DL
HCT VFR BLD AUTO: 47.2 % (ref 37.5–51)
HGB BLD-MCNC: 16.3 G/DL (ref 13–17.7)
HGB UR QL STRIP.AUTO: NEGATIVE
HOLD SPECIMEN: NORMAL
HOLD SPECIMEN: NORMAL
HYALINE CASTS UR QL AUTO: ABNORMAL /LPF
IMM GRANULOCYTES # BLD AUTO: 0.02 10*3/MM3 (ref 0–0.05)
IMM GRANULOCYTES NFR BLD AUTO: 0.3 % (ref 0–0.5)
KETONES UR QL STRIP: NEGATIVE
LEUKOCYTE ESTERASE UR QL STRIP.AUTO: NEGATIVE
LIPASE SERPL-CCNC: 15 U/L (ref 13–60)
LYMPHOCYTES # BLD AUTO: 0.86 10*3/MM3 (ref 0.7–3.1)
LYMPHOCYTES NFR BLD AUTO: 12.5 % (ref 19.6–45.3)
MCH RBC QN AUTO: 31.7 PG (ref 26.6–33)
MCHC RBC AUTO-ENTMCNC: 34.5 G/DL (ref 31.5–35.7)
MCV RBC AUTO: 91.7 FL (ref 79–97)
MONOCYTES # BLD AUTO: 0.64 10*3/MM3 (ref 0.1–0.9)
MONOCYTES NFR BLD AUTO: 9.3 % (ref 5–12)
NEUTROPHILS NFR BLD AUTO: 5.18 10*3/MM3 (ref 1.7–7)
NEUTROPHILS NFR BLD AUTO: 75.6 % (ref 42.7–76)
NITRITE UR QL STRIP: NEGATIVE
NRBC BLD AUTO-RTO: 0 /100 WBC (ref 0–0.2)
PH UR STRIP.AUTO: 5.5 [PH] (ref 5–8)
PLATELET # BLD AUTO: 255 10*3/MM3 (ref 140–450)
PMV BLD AUTO: 10.5 FL (ref 6–12)
POTASSIUM SERPL-SCNC: 4.3 MMOL/L (ref 3.5–5.2)
PROT SERPL-MCNC: 7.3 G/DL (ref 6–8.5)
PROT UR QL STRIP: ABNORMAL
RBC # BLD AUTO: 5.15 10*6/MM3 (ref 4.14–5.8)
RBC # UR STRIP: ABNORMAL /HPF
REF LAB TEST METHOD: ABNORMAL
SARS-COV-2 RNA RESP QL NAA+PROBE: NOT DETECTED
SODIUM SERPL-SCNC: 134 MMOL/L (ref 136–145)
SP GR UR STRIP: 1.03 (ref 1–1.03)
SQUAMOUS #/AREA URNS HPF: ABNORMAL /HPF
UROBILINOGEN UR QL STRIP: ABNORMAL
WBC # UR STRIP: ABNORMAL /HPF
WBC NRBC COR # BLD AUTO: 6.86 10*3/MM3 (ref 3.4–10.8)
WHOLE BLOOD HOLD COAG: NORMAL
WHOLE BLOOD HOLD SPECIMEN: NORMAL

## 2025-02-17 PROCEDURE — 25510000001 IOPAMIDOL 61 % SOLUTION: Performed by: EMERGENCY MEDICINE

## 2025-02-17 PROCEDURE — 74177 CT ABD & PELVIS W/CONTRAST: CPT

## 2025-02-17 PROCEDURE — 87636 SARSCOV2 & INF A&B AMP PRB: CPT | Performed by: EMERGENCY MEDICINE

## 2025-02-17 PROCEDURE — 99285 EMERGENCY DEPT VISIT HI MDM: CPT | Performed by: EMERGENCY MEDICINE

## 2025-02-17 PROCEDURE — 96374 THER/PROPH/DIAG INJ IV PUSH: CPT

## 2025-02-17 PROCEDURE — 25010000002 ONDANSETRON PER 1 MG: Performed by: EMERGENCY MEDICINE

## 2025-02-17 PROCEDURE — 80053 COMPREHEN METABOLIC PANEL: CPT | Performed by: EMERGENCY MEDICINE

## 2025-02-17 PROCEDURE — 25810000003 SODIUM CHLORIDE 0.9 % SOLUTION: Performed by: EMERGENCY MEDICINE

## 2025-02-17 PROCEDURE — 81001 URINALYSIS AUTO W/SCOPE: CPT | Performed by: EMERGENCY MEDICINE

## 2025-02-17 PROCEDURE — 83690 ASSAY OF LIPASE: CPT | Performed by: EMERGENCY MEDICINE

## 2025-02-17 PROCEDURE — 85025 COMPLETE CBC W/AUTO DIFF WBC: CPT | Performed by: EMERGENCY MEDICINE

## 2025-02-17 RX ORDER — ONDANSETRON 4 MG/1
4 TABLET, ORALLY DISINTEGRATING ORAL EVERY 8 HOURS PRN
Qty: 15 TABLET | Refills: 0 | Status: SHIPPED | OUTPATIENT
Start: 2025-02-17

## 2025-02-17 RX ORDER — ONDANSETRON 2 MG/ML
4 INJECTION INTRAMUSCULAR; INTRAVENOUS ONCE
Status: COMPLETED | OUTPATIENT
Start: 2025-02-17 | End: 2025-02-17

## 2025-02-17 RX ORDER — SODIUM CHLORIDE 0.9 % (FLUSH) 0.9 %
10 SYRINGE (ML) INJECTION AS NEEDED
Status: DISCONTINUED | OUTPATIENT
Start: 2025-02-17 | End: 2025-02-17 | Stop reason: HOSPADM

## 2025-02-17 RX ORDER — IOPAMIDOL 612 MG/ML
100 INJECTION, SOLUTION INTRAVASCULAR
Status: COMPLETED | OUTPATIENT
Start: 2025-02-17 | End: 2025-02-17

## 2025-02-17 RX ADMIN — SODIUM CHLORIDE 1000 ML: 9 INJECTION, SOLUTION INTRAVENOUS at 09:23

## 2025-02-17 RX ADMIN — ONDANSETRON 4 MG: 2 INJECTION INTRAMUSCULAR; INTRAVENOUS at 09:22

## 2025-02-17 RX ADMIN — IOPAMIDOL 100 ML: 612 INJECTION, SOLUTION INTRAVENOUS at 10:08

## 2025-02-17 NOTE — ED PROVIDER NOTES
[facility] EMERGENCY DEPARTMENT  Emergency Department Encounter  Emergency Medicine Physician Note     Pt Name:Alok Bedoya  MRN: [id number]  Birthdate 1990  Date of evaluation: 2/17/2025  PCP:  Provider, No Known  Note Started: 9:57 AM EST      CHIEF COMPLAINT       Chief Complaint   Patient presents with    Vomiting    Diarrhea       HISTORY OF PRESENT ILLNESS  (Location/Symptom, Timing/Onset, Context/Setting, Quality, Duration, Modifying Factors, Severity.)      Alok Bedoya is a 34 y.o. male who presents with nausea vomiting and diarrhea.  Patient has had GI symptoms for last couple days, has been taking ODT Zofran with no improvement.  Noted to have GI infectious process in the family, patient child was evaluated recently and patient's wife also describes having symptoms.  Patient describes some fevers and chills.  Patient describes some diffuse abdominal pain, more in the epigastric area.  Patient describes of coffee-ground like emesis and does describe some mild amount of blood in his diarrhea.  Patient denies any chest pain.  Patient describes some mild decrease in urination, no burning with urination.    PAST MEDICAL / SURGICAL / SOCIAL / FAMILY HISTORY     Past Medical History:   Diagnosis Date    Murmur, cardiac     REPORTS THAT ASYMPTOMATIC AND HASNT SEEN CARDIOLOGIST  FOR YEARS     Tear of left glenoid labrum      No additional pertinent       Past Surgical History:   Procedure Laterality Date    HAND SURGERY Left     THUMB HAD TO BE REATTACHED POST ACCIDENT AT WORK    SHOULDER ARTHROSCOPY W/ LABRAL REPAIR Left 3/8/2022    Procedure: LEFT SHOULDER ARTHROSCOPIC LABRAL REPAIR WITH EXCISION PARALABRAL CYST, SUBCROMIAL DECOMPRESSION,DISTAL CLAVICLE EXCISION;  Surgeon: Chapin Caballero MD;  [place] [facility] [place]  Service: Orthopedics;  Laterality: Left;     No additional pertinent       Social History     Socioeconomic History    Marital status: Single   Tobacco Use     Smoking status: Former     Types: Cigarettes    Smokeless tobacco: Never    Tobacco comments:     OVER A YEAR AGO   Vaping Use    Vaping status: Some Days    Substances: Nicotine    Devices: Pre-filled pod   Substance and Sexual Activity    Alcohol use: Not Currently    Drug use: Never    Sexual activity: Defer       History reviewed. No pertinent family history.    Allergies:  Morphine    Home Medications:  Prior to Admission medications    Medication Sig Start Date End Date Taking? Authorizing Provider   albuterol (PROVENTIL) (2.5 MG/3ML) 0.083% nebulizer solution Take 2.5 mg by nebulization Every 4 (Four) Hours As Needed for Wheezing. 10/2/24   Ean Sullivan DO   Aspirin Low Dose 81 MG chewable tablet  6/24/22   Aren Bishop MD   bacitracin 500 UNIT/GM ointment  6/24/22   Aren Bishop MD   busPIRone (BUSPAR) 10 MG tablet Take 1 tablet by mouth 3 (Three) Times a Day. 7/21/24   Aren Bishop MD   cetirizine (zyrTEC) 10 MG tablet Take 1 tablet by mouth Daily for 30 days. 10/2/24 11/1/24  Ean Sullivan DO   escitalopram (LEXAPRO) 20 MG tablet Take 1 tablet by mouth Daily. 9/12/24   Aren Bishop MD   ferrous sulfate 325 (65 FE) MG EC tablet  6/24/22   Aren Bishop MD   folic acid (FOLVITE) 1 MG tablet  6/24/22   Aren Bishop MD   gabapentin (NEURONTIN) 300 MG capsule  6/24/22   Aren Bishop MD   ibuprofen (ADVIL,MOTRIN) 200 MG tablet Take 4 tablets by mouth Daily.    Aren Bishop MD   Lidoderm 5 %  6/24/22   Aren Bishop MD   methocarbamol (ROBAXIN) 500 MG tablet  6/24/22   Aren Bishop MD   naloxone (NARCAN) 4 MG/0.1ML nasal spray  6/24/22   Aren Bishop MD   Prenatal Vit-Fe Fumarate-FA (Trinatal Rx 1) 60-1 MG tablet Take 1 tablet by mouth Daily. 6/24/22   Aren Bishop MD   thiamine 100 MG tablet  6/24/22   Aren Bishop MD   Ventolin  (90 Base) MCG/ACT inhaler Inhale 2  "puffs Every 4 (Four) Hours As Needed for Wheezing or Shortness of Air. 9/30/24   Provider, MD Aren         REVIEW OF SYSTEMS       Review of Systems   Constitutional:  Negative for chills and fever.   Respiratory:  Negative for chest tightness and shortness of breath.    Cardiovascular:  Negative for chest pain.   Gastrointestinal:  Positive for abdominal pain, blood in stool, diarrhea, nausea and vomiting.   Genitourinary:  Negative for flank pain.   Neurological:  Negative for dizziness and light-headedness.       PHYSICAL EXAM      INITIAL VITALS:   /84   Pulse 85   Temp 98.2 °F (36.8 °C) (Oral)   Resp 18   Ht 188 cm (74\")   Wt 79.4 kg (175 lb)   SpO2 95%   BMI 22.47 kg/m²     Physical Exam  Constitutional:       Appearance: Normal appearance.   HENT:      Head: Normocephalic and atraumatic.   Eyes:      Extraocular Movements: Extraocular movements intact.   Cardiovascular:      Rate and Rhythm: Normal rate and regular rhythm.   Pulmonary:      Effort: Pulmonary effort is normal.      Breath sounds: Normal breath sounds.   Abdominal:      General: Abdomen is flat.      Palpations: Abdomen is soft.      Tenderness: There is abdominal tenderness (Generalized tenderness).   Musculoskeletal:      Right lower leg: No edema.      Left lower leg: No edema.   Skin:     General: Skin is warm and dry.   Neurological:      General: No focal deficit present.      Mental Status: He is alert and oriented to person, place, and time.   Psychiatric:         Mood and Affect: Mood normal.         Behavior: Behavior normal.           DDX/DIAGNOSTIC RESULTS / EMERGENCY DEPARTMENT COURSE / MDM     Differential Diagnosis included but not limited: Gastroenteritis, diverticulitis, invasive bacterial infection of GI tract including Shigella toxin producing producing organisms, norovirus as this is endemic at this time    Diagnoses Considered but Do Not Suspect: Low concern for severe GI bleed as patient has high normal " hemoglobin.     Decision Rules/Scores utilized: N/A     Tests considered but not ordered and why:  N/A     MIPS: N/A     Code Status Discussion:  Not Discussed    Additional Patient Education Provided: None     Medical Decision Making    Medical Decision Making  This patient presents with nausea, vomiting & diarrhea. Differential diagnosis includes possible acute gastroenteritis. Abdominal exam without peritoneal signs. Currently euvolemic without evidence of dehydration, .  Patient given a liter of IV fluids with some improvement in symptoms.  Doubt invasive bacteria causing diarrhea such as C diff (no recent antibiotics), shiga toxin, this is possible as patient does describe some mild blood in the stool. No recent travel. Patient is not immunocompromised. inflammatory bowel disease is possible,  Low concern as patient has positive contacts with other people of had similar symptoms and feel this is more of an infectious process. No evidence of surgical abdomen or other acute medical emergency including bowel obstruction, viscus perforation, vascular catastrophe, atypical appendicitis, acute cholecystitis, UGIB, thyrotoxicosis, or diverticulitis at this time, negative CT imaging. Presentation not consistent with other acute, emergent causes of vomiting / diarrhea at this time.  Patient tolerating p.o. on reevaluation, patient with stable vitals.  Patient instructed to return for any worsening abdominal pain, nausea vomiting, abdominal pain is painful with walking, signs of dehydration including dry mouth.  Patient was agreement with the plan and discharged in stable condition.     Problems Addressed:  Nausea vomiting and diarrhea: complicated acute illness or injury    Amount and/or Complexity of Data Reviewed  External Data Reviewed: labs and notes.  Labs: ordered. Decision-making details documented in ED Course.  Radiology: ordered.    Risk  Prescription drug management.        See ED COURSE for additional MDM  statements    EKG  None Performed     All EKG's are interpreted by the Emergency Department Physician who either signs or Co-signs this chart in the absence of a cardiologist.    Additional Scores                   EMERGENCY DEPARTMENT COURSE:    ED Course as of 02/18/25 1017   Mon Feb 17, 2025   1013 Creatinine(!): 1.34  Noted to have mildly worsening creatinine, this may be secondary to dehydration due to nausea and vomiting diarrhea. [CR]      ED Course User Index  [CR] Ean Sullivan,        PROCEDURES:  None Performed   Procedures    DATA FOR LAB AND RADIOLOGY TESTS ORDERED BELOW ARE REVIEWED BY THE ED CLINICIAN:    RADIOLOGY: All x-rays, CT, MRI, and formal ultrasound images (except ED bedside ultrasound) are read by the radiologist, see reports below, unless otherwise noted in MDM or here.  Reports below are reviewed by myself.  CT Abdomen Pelvis With Contrast   Final Result   1. No evidence of appendicitis   2. Fluid-filled nondilated large and small bowel may be seen with ileus   or enterocolitis   3. No evidence of acute gallbladder disease or biliary obstruction           This study was performed with techniques to keep radiation doses as low   as reasonably achievable (ALARA). Individualized dose reduction   techniques using automated exposure control or adjustment of vA and/or   kV according to the patient size were employed.       This report was signed and finalized on 2/17/2025 10:35 AM by Saúl Pelayo MD.              LABS: Lab orders shown below, the results are reviewed by myself, and all abnormals are listed below.  Labs Reviewed   COMPREHENSIVE METABOLIC PANEL - Abnormal; Notable for the following components:       Result Value    Glucose 112 (*)     Creatinine 1.34 (*)     Sodium 134 (*)     All other components within normal limits    Narrative:     GFR Categories in Chronic Kidney Disease (CKD)      GFR Category          GFR (mL/min/1.73)    Interpretation  G1                      90 or greater         Normal or high (1)  G2                      60-89                Mild decrease (1)  G3a                   45-59                Mild to moderate decrease  G3b                   30-44                Moderate to severe decrease  G4                    15-29                Severe decrease  G5                    14 or less           Kidney failure          (1)In the absence of evidence of kidney disease, neither GFR category G1 or G2 fulfill the criteria for CKD.    eGFR calculation 2021 CKD-EPI creatinine equation, which does not include race as a factor   URINALYSIS W/ MICROSCOPIC IF INDICATED (NO CULTURE) - Abnormal; Notable for the following components:    Protein,  mg/dL (2+) (*)     All other components within normal limits   CBC WITH AUTO DIFFERENTIAL - Abnormal; Notable for the following components:    Lymphocyte % 12.5 (*)     All other components within normal limits   URINALYSIS, MICROSCOPIC ONLY - Abnormal; Notable for the following components:    Bacteria, UA Trace (*)     Squamous Epithelial Cells, UA 3-6 (*)     All other components within normal limits   COVID-19 AND FLU A/B, NP SWAB IN TRANSPORT MEDIA 1 HR TAT - Normal    Narrative:     Fact sheet for providers: https://www.fda.gov/media/046219/download    Fact sheet for patients: https://www.fda.gov/media/405008/download    Test performed by PCR.   LIPASE - Normal   RAINBOW DRAW    Narrative:     The following orders were created for panel order [place] Draw.  Procedure                               Abnormality         Status                     ---------                               -----------         ------                     Green Top (Gel)[[id number]]                                  Final result               Lavender Top[[id number]]                                     Final result               Gold Top - SST[[id number]]                                   Final result               Light Blue Top[[id number]]              "                      Final result                 Please view results for these tests on the individual orders.   CBC AND DIFFERENTIAL    Narrative:     The following orders were created for panel order CBC & Differential.  Procedure                               Abnormality         Status                     ---------                               -----------         ------                     CBC Auto Differential[[id number]]        Abnormal            Final result                 Please view results for these tests on the individual orders.   GREEN TOP   LAVENDER TOP   GOLD TOP - SST   LIGHT BLUE TOP       Vitals Reviewed:    Vitals:    02/17/25 0839 02/17/25 0900   BP: 127/84 124/84   BP Location: Left arm    Patient Position: Sitting    Pulse: 85    Resp: 18    Temp: 98.2 °F (36.8 °C)    TempSrc: Oral    SpO2: 98% 95%   Weight: 79.4 kg (175 lb)    Height: 188 cm (74\")        MEDICATIONS GIVEN TO PATIENT THIS ENCOUNTER:  Medications   sodium chloride 0.9 % bolus 1,000 mL (0 mL Intravenous Stopped 2/17/25 1027)   ondansetron (ZOFRAN) injection 4 mg (4 mg Intravenous Given 2/17/25 0922)   iopamidol (ISOVUE-300) 61 % injection 100 mL (100 mL Intravenous Given 2/17/25 1008)       CONSULTS:  None    CRITICAL CARE:  There was significant risk of life threatening deterioration of patient's condition requiring my direct management. Critical care time 0 minutes, excluding any documented procedures.    FINAL IMPRESSION      1. Nausea vomiting and diarrhea          DISPOSITION / PLAN     ED Disposition       ED Disposition   Discharge    Condition   Stable    Comment   --               PATIENT REFERRED TO:  [facility]IENT [facility] - [facility] [address] [phone]  Schedule an appointment as soon as possible for a visit   Call to schedule primary care appointment in the next couple days.      DISCHARGE MEDICATIONS:     Medication List        START taking these medications      ondansetron ODT 4 MG disintegrating " tablet  Commonly known as: ZOFRAN-ODT  Place 1 tablet on the tongue Every 8 (Eight) Hours As Needed for Nausea or Vomiting.            CONTINUE taking these medications      Aspirin Low Dose 81 MG chewable tablet  Generic drug: aspirin     bacitracin 500 UNIT/GM ointment     busPIRone 10 MG tablet  Commonly known as: BUSPAR     cetirizine 10 MG tablet  Commonly known as: zyrTEC  Take 1 tablet by mouth Daily for 30 days.     escitalopram 20 MG tablet  Commonly known as: LEXAPRO     ferrous sulfate 325 (65 FE) MG EC tablet     folic acid 1 MG tablet  Commonly known as: FOLVITE     gabapentin 300 MG capsule  Commonly known as: NEURONTIN     ibuprofen 200 MG tablet  Commonly known as: ADVIL,MOTRIN     Lidoderm 5 %  Generic drug: lidocaine     methocarbamol 500 MG tablet  Commonly known as: ROBAXIN     naloxone 4 MG/0.1ML nasal spray  Commonly known as: NARCAN     thiamine 100 MG tablet  Commonly known as: VITAMIN B1     Trinatal Rx 1 60-1 MG tablet     * Ventolin  (90 Base) MCG/ACT inhaler  Generic drug: albuterol sulfate HFA     * albuterol (2.5 MG/3ML) 0.083% nebulizer solution  Commonly known as: PROVENTIL  Take 2.5 mg by nebulization Every 4 (Four) Hours As Needed for Wheezing.           * This list has 2 medication(s) that are the same as other medications prescribed for you. Read the directions carefully, and ask your doctor or other care provider to review them with you.                   Where to Get Your Medications        These medications were sent to [facility]/pharmacy #[id number] [place], [place] - [address] [place] [address] [phone]  - [phone] [address] [address]      Phone: [phone]   ondansetron ODT 4 MG disintegrating tablet         Electronically signed by Ean Sullivan DO, 02/17/25, 9:57 AM EST.    Emergency Medicine Physician  [facility]  (Please note that portions of thisnote were completed with a voice recognition program.  Efforts were made  to edit the dictations but occasionally words are mis-transcribed.)         Ean Sullivan, DO  02/18/25 1018

## 2025-02-17 NOTE — DISCHARGE INSTRUCTIONS
If you notice any concerning symptoms, please return to the ER immediately. These can include but are not limited to: worsening of you condition, fevers, chills, shortness of breath, vomiting, weakness of the extremities, changes in your mental status, numbness, pale extremities, or chest pain.     Take medications as prescribed, your pharmacist may have additional recommendations concerning these medications.    For pain use ibuprofen (Motrin) or acetaminophen (Tylenol), unless prescribed medications that also contain these medications.  You can take over the counter acetaminophen or ibuprofen, please follow the directions as dosages differ. Do not take ibuprofen if you have a history of peptic ulcers, kidney disease, bariatric surgery, or are currently pregnant.  Do not take Tylenol if you have a history of liver disease or alcohol use disorder.        THANK YOU!!! From [facility]    On behalf of the Emergency Department staff at [facility], I would like to thank you for giving us the opportunity to address your health care needs and concerns. We hope that during your visit, our service was delivered in a professional and caring manner. Please keep [facility] in mind as we walk with you down the path to your own personal wellness. Please expect follow-up phone calls concerning additional care and questions about your experience.      You have received additional information specific to your diagnosis in these discharge instructions, please read these fully.  Anytime you have been seen in the emergency department we recommend close follow up with your primary care provider or specialist, please follow these directions as indicated.      You may take 2 doses of Zofran at the same time once a day for total of 8 mg at that time.  Please return for any worsening signs of dehydration.  Please drink lots of fluids.

## (undated) DEVICE — KT INST FOR FIBERTAK W/CRV/SPEAR RIGD/DRL/BIT 1.8MM DISP

## (undated) DEVICE — SUTURELASSO CRV 45D RT

## (undated) DEVICE — SUTURELASSO STR 90D  AR406890

## (undated) DEVICE — CANN TRPL DAM 7X7MM

## (undated) DEVICE — 90-S CRUISE, SUCTION PROBE, NON-BENDABLE, MAX CUT LEVEL 1: Brand: SERFAS ENERGY

## (undated) DEVICE — STERILE POLYISOPRENE POWDER-FREE SURGICAL GLOVES WITH EMOLLIENT COATING: Brand: PROTEXIS

## (undated) DEVICE — STERILE POLYISOPRENE POWDER-FREE SURGICAL GLOVES: Brand: PROTEXIS

## (undated) DEVICE — SLV SCD KN/LEN ADJ EXPRSS BLENDED MD 1P/U

## (undated) DEVICE — OSC BEACH CHAIR SHOULDER-LF: Brand: MEDLINE INDUSTRIES, INC.

## (undated) DEVICE — GLV SURG SENSICARE ORTHO PF LF 8 STRL

## (undated) DEVICE — GAUZE,SPONGE,4"X4",16PLY,STRL,LF,10/TRAY: Brand: MEDLINE

## (undated) DEVICE — INTEGRATED CASSETTE TUBING, DO NOT USE IF PACKAGE IS DAMAGED: Brand: CROSSFLOW

## (undated) DEVICE — INTENDED FOR TISSUE SEPARATION, AND OTHER PROCEDURES THAT REQUIRE A SHARP SURGICAL BLADE TO PUNCTURE OR CUT.: Brand: BARD-PARKER ® CARBON RIB-BACK BLADES

## (undated) DEVICE — SUT PROLN 0 CT1 30IN 8424H

## (undated) DEVICE — PAD GRND REM POLYHESIVE A/ DISP

## (undated) DEVICE — DRSNG GZ PETROLTM XEROFORM CURAD 1X8IN STRL

## (undated) DEVICE — BURSECTOR 5.5 MM X 125 MM.  DO NOT RESTERILIZE, DO NOT USE IF PACKAGE IS DAMAGED, KEEP DRY, KEEP AWAY FROM DIRECT SUNLIGHT: Brand: CROSSBLADE

## (undated) DEVICE — APPL CHLORAPREP HI/LITE 26ML ORNG

## (undated) DEVICE — Device

## (undated) DEVICE — SLV DISTRACTION STAR VELCRO XLNG DISP

## (undated) DEVICE — SOL IRR NACL 0.9PCT 3000ML

## (undated) DEVICE — GLV SURG SENSICARE SLT PF LF 6.5 STRL

## (undated) DEVICE — SUT ETHLN 3-0 FS118IN 663H

## (undated) DEVICE — BLD CUT FORMLA AGGR PLS 4.0MM